# Patient Record
Sex: MALE | Race: WHITE | Employment: FULL TIME | URBAN - NONMETROPOLITAN AREA
[De-identification: names, ages, dates, MRNs, and addresses within clinical notes are randomized per-mention and may not be internally consistent; named-entity substitution may affect disease eponyms.]

---

## 2020-02-07 ENCOUNTER — OFFICE VISIT (OUTPATIENT)
Dept: FAMILY MEDICINE CLINIC | Age: 30
End: 2020-02-07
Payer: COMMERCIAL

## 2020-02-07 VITALS
BODY MASS INDEX: 27.2 KG/M2 | OXYGEN SATURATION: 98 % | WEIGHT: 190 LBS | SYSTOLIC BLOOD PRESSURE: 126 MMHG | RESPIRATION RATE: 19 BRPM | DIASTOLIC BLOOD PRESSURE: 72 MMHG | HEART RATE: 64 BPM | TEMPERATURE: 97.5 F | HEIGHT: 70 IN

## 2020-02-07 PROBLEM — F32.A MILD DEPRESSION: Status: ACTIVE | Noted: 2020-02-07

## 2020-02-07 PROCEDURE — G0444 DEPRESSION SCREEN ANNUAL: HCPCS | Performed by: CLINICAL NURSE SPECIALIST

## 2020-02-07 PROCEDURE — 99202 OFFICE O/P NEW SF 15 MIN: CPT | Performed by: CLINICAL NURSE SPECIALIST

## 2020-02-07 RX ORDER — M-VIT,TX,IRON,MINS/CALC/FOLIC 27MG-0.4MG
1 TABLET ORAL DAILY
COMMUNITY
End: 2020-11-05 | Stop reason: ALTCHOICE

## 2020-02-07 ASSESSMENT — ENCOUNTER SYMPTOMS
COLOR CHANGE: 0
SORE THROAT: 0
CHEST TIGHTNESS: 0
VOMITING: 0
TROUBLE SWALLOWING: 0
NAUSEA: 0
CONSTIPATION: 0
COUGH: 0
DIARRHEA: 0
EYE PAIN: 0
EYE DISCHARGE: 0
SHORTNESS OF BREATH: 0
WHEEZING: 0
BACK PAIN: 0
ABDOMINAL PAIN: 0
SINUS PRESSURE: 0

## 2020-02-07 ASSESSMENT — PATIENT HEALTH QUESTIONNAIRE - PHQ9
6. FEELING BAD ABOUT YOURSELF - OR THAT YOU ARE A FAILURE OR HAVE LET YOURSELF OR YOUR FAMILY DOWN: 2
3. TROUBLE FALLING OR STAYING ASLEEP: 0
4. FEELING TIRED OR HAVING LITTLE ENERGY: 2
SUM OF ALL RESPONSES TO PHQ9 QUESTIONS 1 & 2: 4
8. MOVING OR SPEAKING SO SLOWLY THAT OTHER PEOPLE COULD HAVE NOTICED. OR THE OPPOSITE, BEING SO FIGETY OR RESTLESS THAT YOU HAVE BEEN MOVING AROUND A LOT MORE THAN USUAL: 0
9. THOUGHTS THAT YOU WOULD BE BETTER OFF DEAD, OR OF HURTING YOURSELF: 0
SUM OF ALL RESPONSES TO PHQ QUESTIONS 1-9: 9
SUM OF ALL RESPONSES TO PHQ QUESTIONS 1-9: 9
2. FEELING DOWN, DEPRESSED OR HOPELESS: 2
10. IF YOU CHECKED OFF ANY PROBLEMS, HOW DIFFICULT HAVE THESE PROBLEMS MADE IT FOR YOU TO DO YOUR WORK, TAKE CARE OF THINGS AT HOME, OR GET ALONG WITH OTHER PEOPLE: 2
5. POOR APPETITE OR OVEREATING: 1
7. TROUBLE CONCENTRATING ON THINGS, SUCH AS READING THE NEWSPAPER OR WATCHING TELEVISION: 0
1. LITTLE INTEREST OR PLEASURE IN DOING THINGS: 2

## 2020-02-07 NOTE — PROGRESS NOTES
SUBJECTIVE:  Bobo Yoo is a 30 y.o. who presents today for Established New Doctor and Depression      HPI    Bandar Flores presents today to establish care. He has been healthy most of his life, as an adult only going to  when ill or physical exams by [employer]. Today, he c/o fatigue and lack of motivation. Over the past year he has really seen a change. He shows interest in things, but does not have the drive. He is not completing tasks. Mood is fluctuating, some days more high and others low. No Si/HI. No trouble sleeping. He relates low libido. He has noticed that he is more easily agitated, very little patience. Scored 9 on depression screening today. History reviewed. No pertinent past medical history. History reviewed. No pertinent surgical history. Family History   Problem Relation Age of Onset    Diabetes Mother     Obesity Mother     High Cholesterol Mother     Obesity Father     Cancer Maternal Grandmother     Stroke Maternal Grandmother     Breast Cancer Paternal Grandmother     Heart Attack Paternal Grandfather     Alcohol Abuse Paternal Grandfather      Social History     Tobacco Use    Smoking status: Former Smoker     Packs/day: 0.50     Years: 3.00     Pack years: 1.50     Types: Cigarettes     Last attempt to quit: 2018     Years since quittin.7    Smokeless tobacco: Current User     Types: Snuff   Substance Use Topics    Alcohol use: Yes     Current Outpatient Medications   Medication Sig Dispense Refill    Multiple Vitamins-Minerals (THERAPEUTIC MULTIVITAMIN-MINERALS) tablet Take 1 tablet by mouth daily       No current facility-administered medications for this visit. Allergies   Allergen Reactions    Albuterol Nausea And Vomiting       Review of Systems   Constitutional: Positive for fatigue. Negative for appetite change, chills, diaphoresis and fever.    HENT: Negative for congestion, ear pain, hearing loss, postnasal drip, sinus pressure, Normal range of motion. General: No deformity. Lymphadenopathy:      Cervical: No cervical adenopathy. Skin:     General: Skin is warm and dry. Findings: No erythema or rash. Neurological:      General: No focal deficit present. Mental Status: He is alert and oriented to person, place, and time. Mental status is at baseline. Psychiatric:         Attention and Perception: Attention and perception normal.         Mood and Affect: Mood and affect normal.         Speech: Speech normal.         Behavior: Behavior normal. Behavior is cooperative. Thought Content: Thought content normal.         Cognition and Memory: Cognition and memory normal.         ASSESSMENT/PLAN:  1. Chronic fatigue  - CBC Auto Differential; Future  - Comprehensive Metabolic Panel; Future  - Ferritin; Future  - Folate; Future  - Iron; Future  - Vitamin B12; Future  - TSH without Reflex; Future  - T4, Free; Future  - Testosterone; Future    2. Lipid screening  - Lipid Panel; Future    3. Mild depression ([facility] [address]      Will rule out metabolic or hormonal causes of fatigue and mild depression. If labs negative, will start SSRI as discussed today and recheck 8 weeks.            Return in about 8 weeks (around 4/3/2020) for physical.

## 2020-02-12 ENCOUNTER — TELEPHONE (OUTPATIENT)
Dept: FAMILY MEDICINE CLINIC | Age: 30
End: 2020-02-12

## 2020-02-12 NOTE — TELEPHONE ENCOUNTER
BRYNN and advised pt that his B12 and testosterone was not completed. Will contact lab tomorrow to see if they are able to add. Did ask if he wanted to start taking anything for his mood. ----- Message from NELLY Figueroa sent at 2/12/2020  9:33 AM CST -----  His labs are ok. I do not see a B12 or testosterone level though. Based on these labs, I can go ahead and send him treatment for mood. If he still desires?

## 2020-02-13 RX ORDER — SERTRALINE HYDROCHLORIDE 25 MG/1
25 TABLET, FILM COATED ORAL DAILY
Qty: 30 TABLET | Refills: 5 | Status: SHIPPED | OUTPATIENT
Start: 2020-02-13 | End: 2020-03-31 | Stop reason: SDUPTHER

## 2020-02-13 NOTE — TELEPHONE ENCOUNTER
Patient returned call and reports he would like to proceed with the medication for his mood. Patient would like to have the medication sent to [facility]. Also, received the missing lab results that were requested from [address] [place] [address]. Results have been scanned to patient chart and sent to provider.

## 2020-03-31 ENCOUNTER — VIRTUAL VISIT (OUTPATIENT)
Dept: FAMILY MEDICINE CLINIC | Age: 30
End: 2020-03-31
Payer: COMMERCIAL

## 2020-03-31 PROCEDURE — 99213 OFFICE O/P EST LOW 20 MIN: CPT | Performed by: CLINICAL NURSE SPECIALIST

## 2020-03-31 NOTE — PROGRESS NOTES
SUBJECTIVE:  Stephanie Santos is a 35 y.o. who presents today for Follow-up and Depression      HPI    VIRTUAL VISIT VIA TELEPHONE    _______________________________________________________________    Due to COVID 23 outbreak, patient's office visit was converted to telephone virtual visit. Patient was contacted and agreed to proceed with a telephone virtual visit. The risks and benefits of converting to a telephone virtual visit were discussed in light of the current infectious disease epidemic. Patient also understood that insurance coverage and co-pays are up to their individual insurance plans. Madi Martin was evaluated today via virtual video doxy. me  Today was follow up on mild depressive disorder. After labs were completed, overall normal other than mild b12 deficiency. He was started on sertraline 25mg once daily for mild depression, mood changes and irritability. He is doing better on this. He notes improvement in patience and less agitation. No side effects, other than gaining 8-10lb but may also be related to current restrictions around COVID-19. He does desire further improvement in symptoms. Past Medical History:   Diagnosis Date    Mild depression ([address] 2020     No past surgical history on file.   Family History   Problem Relation Age of Onset    Diabetes Mother     Obesity Mother     High Cholesterol Mother     Obesity Father     Cancer Maternal Grandmother     Stroke Maternal Grandmother     Breast Cancer Paternal Grandmother     Heart Attack Paternal Grandfather     Alcohol Abuse Paternal Grandfather      Social History     Tobacco Use    Smoking status: Former Smoker     Packs/day: 0.50     Years: 3.00     Pack years: 1.50     Types: Cigarettes     Last attempt to quit: 2018     Years since quittin.9    Smokeless tobacco: Current User     Types: Snuff   Substance Use Topics    Alcohol use: Yes     Current Outpatient Medications   Medication Sig Dispense Refill    sertraline (ZOLOFT) 50 MG tablet Take 1 tablet by mouth daily 30 tablet 5    Multiple Vitamins-Minerals (THERAPEUTIC MULTIVITAMIN-MINERALS) tablet Take 1 tablet by mouth daily       No current facility-administered medications for this visit. Allergies   Allergen Reactions    Albuterol Nausea And Vomiting       Review of Systems   Psychiatric/Behavioral: Positive for dysphoric mood. Negative for agitation, behavioral problems, confusion, decreased concentration, self-injury, sleep disturbance and suicidal ideas. The patient is not nervous/anxious and is not hyperactive. OBJECTIVE:  There were no vitals taken for this visit. Physical Exam  Constitutional:       General: He is not in acute distress. Appearance: Normal appearance. He is not ill-appearing or toxic-appearing. Neurological:      Mental Status: He is alert and oriented to person, place, and time. Mental status is at baseline. Psychiatric:         Attention and Perception: Attention and perception normal.         Mood and Affect: Mood and affect normal.         Speech: Speech normal.         Behavior: Behavior normal. Behavior is cooperative. Thought Content: Thought content normal.         Cognition and Memory: Cognition and memory normal.         Judgment: Judgment normal.         ASSESSMENT/PLAN:  1. Mild depression (HCC)  Improving, increase sertraline to 50mg and follow up for improvement   - sertraline (ZOLOFT) 50 MG tablet; Take 1 tablet by mouth daily  Dispense: 30 tablet; Refill: 5    Greater than 50% of 15 minute visit was spent in direct coordination with the patient for MDM.            Return in about 2 months (around 5/31/2020) for physical.

## 2020-08-16 ENCOUNTER — APPOINTMENT (OUTPATIENT)
Dept: CT IMAGING | Facility: HOSPITAL | Age: 30
End: 2020-08-16

## 2020-08-16 ENCOUNTER — HOSPITAL ENCOUNTER (EMERGENCY)
Facility: HOSPITAL | Age: 30
Discharge: HOME | End: 2020-08-16
Admitting: EMERGENCY MEDICINE

## 2020-08-16 VITALS
BODY MASS INDEX: 27.72 KG/M2 | TEMPERATURE: 98.3 F | HEART RATE: 88 BPM | RESPIRATION RATE: 18 BRPM | DIASTOLIC BLOOD PRESSURE: 80 MMHG | HEIGHT: 71 IN | WEIGHT: 198 LBS | OXYGEN SATURATION: 98 % | SYSTOLIC BLOOD PRESSURE: 130 MMHG

## 2020-08-16 DIAGNOSIS — K40.20 BILATERAL INGUINAL HERNIA WITHOUT OBSTRUCTION OR GANGRENE, RECURRENCE NOT SPECIFIED: Primary | ICD-10-CM

## 2020-08-16 LAB
ALBUMIN SERPL-MCNC: 4.7 G/DL (ref 3.5–5.2)
ALBUMIN/GLOB SERPL: 1.9 G/DL
ALP SERPL-CCNC: 66 U/L (ref 39–117)
ALT SERPL W P-5'-P-CCNC: 18 U/L (ref 1–41)
ANION GAP SERPL CALCULATED.3IONS-SCNC: 10 MMOL/L (ref 5–15)
AST SERPL-CCNC: 16 U/L (ref 1–40)
BASOPHILS # BLD AUTO: 0.05 10*3/MM3 (ref 0–0.2)
BASOPHILS NFR BLD AUTO: 0.8 % (ref 0–1.5)
BILIRUB SERPL-MCNC: 0.2 MG/DL (ref 0–1.2)
BILIRUB UR QL STRIP: NEGATIVE
BUN SERPL-MCNC: 13 MG/DL (ref 6–20)
BUN/CREAT SERPL: 16.7 (ref 7–25)
CALCIUM SPEC-SCNC: 9.4 MG/DL (ref 8.6–10.5)
CHLORIDE SERPL-SCNC: 104 MMOL/L (ref 98–107)
CLARITY UR: CLEAR
CO2 SERPL-SCNC: 27 MMOL/L (ref 22–29)
COLOR UR: YELLOW
CREAT SERPL-MCNC: 0.78 MG/DL (ref 0.76–1.27)
DEPRECATED RDW RBC AUTO: 42.2 FL (ref 37–54)
EOSINOPHIL # BLD AUTO: 0.14 10*3/MM3 (ref 0–0.4)
EOSINOPHIL NFR BLD AUTO: 2.2 % (ref 0.3–6.2)
ERYTHROCYTE [DISTWIDTH] IN BLOOD BY AUTOMATED COUNT: 12.2 % (ref 12.3–15.4)
GFR SERPL CREATININE-BSD FRML MDRD: 118 ML/MIN/1.73
GLOBULIN UR ELPH-MCNC: 2.5 GM/DL
GLUCOSE SERPL-MCNC: 124 MG/DL (ref 65–99)
GLUCOSE UR STRIP-MCNC: NEGATIVE MG/DL
HCT VFR BLD AUTO: 42.8 % (ref 37.5–51)
HGB BLD-MCNC: 14.9 G/DL (ref 13–17.7)
HGB UR QL STRIP.AUTO: NEGATIVE
IMM GRANULOCYTES # BLD AUTO: 0.01 10*3/MM3 (ref 0–0.05)
IMM GRANULOCYTES NFR BLD AUTO: 0.2 % (ref 0–0.5)
KETONES UR QL STRIP: NEGATIVE
LEUKOCYTE ESTERASE UR QL STRIP.AUTO: NEGATIVE
LYMPHOCYTES # BLD AUTO: 2.47 10*3/MM3 (ref 0.7–3.1)
LYMPHOCYTES NFR BLD AUTO: 39 % (ref 19.6–45.3)
MCH RBC QN AUTO: 32.6 PG (ref 26.6–33)
MCHC RBC AUTO-ENTMCNC: 34.8 G/DL (ref 31.5–35.7)
MCV RBC AUTO: 93.7 FL (ref 79–97)
MONOCYTES # BLD AUTO: 0.5 10*3/MM3 (ref 0.1–0.9)
MONOCYTES NFR BLD AUTO: 7.9 % (ref 5–12)
NEUTROPHILS NFR BLD AUTO: 3.17 10*3/MM3 (ref 1.7–7)
NEUTROPHILS NFR BLD AUTO: 49.9 % (ref 42.7–76)
NITRITE UR QL STRIP: NEGATIVE
NRBC BLD AUTO-RTO: 0 /100 WBC (ref 0–0.2)
PH UR STRIP.AUTO: 5.5 [PH] (ref 5–8)
PLATELET # BLD AUTO: 228 10*3/MM3 (ref 140–450)
PMV BLD AUTO: 10.6 FL (ref 6–12)
POTASSIUM SERPL-SCNC: 4.1 MMOL/L (ref 3.5–5.2)
PROT SERPL-MCNC: 7.2 G/DL (ref 6–8.5)
PROT UR QL STRIP: NEGATIVE
RBC # BLD AUTO: 4.57 10*6/MM3 (ref 4.14–5.8)
SODIUM SERPL-SCNC: 141 MMOL/L (ref 136–145)
SP GR UR STRIP: 1.02 (ref 1–1.03)
UROBILINOGEN UR QL STRIP: NORMAL
WBC # BLD AUTO: 6.34 10*3/MM3 (ref 3.4–10.8)

## 2020-08-16 PROCEDURE — 85025 COMPLETE CBC W/AUTO DIFF WBC: CPT | Performed by: PHYSICIAN ASSISTANT

## 2020-08-16 PROCEDURE — 87661 TRICHOMONAS VAGINALIS AMPLIF: CPT | Performed by: PHYSICIAN ASSISTANT

## 2020-08-16 PROCEDURE — 25010000002 IOPAMIDOL 61 % SOLUTION: Performed by: PHYSICIAN ASSISTANT

## 2020-08-16 PROCEDURE — 87591 N.GONORRHOEAE DNA AMP PROB: CPT | Performed by: PHYSICIAN ASSISTANT

## 2020-08-16 PROCEDURE — 99283 EMERGENCY DEPT VISIT LOW MDM: CPT

## 2020-08-16 PROCEDURE — 80053 COMPREHEN METABOLIC PANEL: CPT | Performed by: PHYSICIAN ASSISTANT

## 2020-08-16 PROCEDURE — 87491 CHLMYD TRACH DNA AMP PROBE: CPT | Performed by: PHYSICIAN ASSISTANT

## 2020-08-16 PROCEDURE — 96361 HYDRATE IV INFUSION ADD-ON: CPT

## 2020-08-16 PROCEDURE — 96374 THER/PROPH/DIAG INJ IV PUSH: CPT

## 2020-08-16 PROCEDURE — 74177 CT ABD & PELVIS W/CONTRAST: CPT

## 2020-08-16 PROCEDURE — 81003 URINALYSIS AUTO W/O SCOPE: CPT | Performed by: PHYSICIAN ASSISTANT

## 2020-08-16 PROCEDURE — 25010000002 KETOROLAC TROMETHAMINE PER 15 MG: Performed by: PHYSICIAN ASSISTANT

## 2020-08-16 RX ORDER — SODIUM CHLORIDE 9 MG/ML
125 INJECTION, SOLUTION INTRAVENOUS CONTINUOUS
Status: DISCONTINUED | OUTPATIENT
Start: 2020-08-16 | End: 2020-08-16 | Stop reason: HOSPADM

## 2020-08-16 RX ORDER — SODIUM CHLORIDE 0.9 % (FLUSH) 0.9 %
10 SYRINGE (ML) INJECTION AS NEEDED
Status: DISCONTINUED | OUTPATIENT
Start: 2020-08-16 | End: 2020-08-16 | Stop reason: HOSPADM

## 2020-08-16 RX ORDER — KETOROLAC TROMETHAMINE 30 MG/ML
30 INJECTION, SOLUTION INTRAMUSCULAR; INTRAVENOUS ONCE
Status: COMPLETED | OUTPATIENT
Start: 2020-08-16 | End: 2020-08-16

## 2020-08-16 RX ADMIN — SODIUM CHLORIDE 125 ML/HR: 9 INJECTION, SOLUTION INTRAVENOUS at 09:18

## 2020-08-16 RX ADMIN — IOPAMIDOL 100 ML: 612 INJECTION, SOLUTION INTRAVENOUS at 10:30

## 2020-08-16 RX ADMIN — KETOROLAC TROMETHAMINE 30 MG: 30 INJECTION, SOLUTION INTRAMUSCULAR; INTRAVENOUS at 09:18

## 2020-08-16 NOTE — ED NOTES
Pain in right and left groin areas as well as middle abdomen.     Jere Henriquez, RN  08/16/20 0860

## 2020-08-16 NOTE — ED PROVIDER NOTES
Subjective   History of Present Illness    Patient is a pleasant 30-year-old gentleman with chief complaint of bilateral inguinal pain.  The patient describes that he is enrolled in the .  As he was doing sit ups, he felt some discomfort in his bilateral inguinal region.  At first, it was tolerable but then it progressed after he did 30 sit ups and onto the 50th sit up.  The patient reports he soon began running there after and had worsening pain.  He was excruciating to the point where he could not complete running.  It was severe.  He had taken ibuprofen which did improve his pain.  He went on to a 6 out of 10 prior to coming into the ER and currently it is a 4 out of the 10.  He denies any previous pain like this before.  He denies any pain in his penis or scrotum.  He has a history of previous testicular torsions as the past presented completely differently.  The patient reports he is otherwise healthy.  He denies any trauma.  He denies fluctuant weights.  He denies any associated fever, nausea, vomiting, or other abdominal pain.  He denies any painful urination, penile discharge, or other  issues.  He reports prior to today's symptoms, he denies any other issues.    Review of Systems   Constitutional: Positive for activity change. Negative for fever.   HENT: Negative.    Respiratory: Negative.    Cardiovascular: Negative.    Gastrointestinal: Negative.  Negative for constipation, diarrhea and nausea.   Genitourinary: Negative.  Negative for decreased urine volume, difficulty urinating, discharge, dysuria, flank pain, frequency, genital sores, hematuria, penile pain, penile swelling, scrotal swelling, testicular pain and urgency.   Musculoskeletal: Negative.    Skin: Negative.    Neurological: Negative.    Psychiatric/Behavioral: Negative.    All other systems reviewed and are negative.      History reviewed. No pertinent past medical history.    No Known Allergies    History reviewed. No pertinent  "surgical history.    History reviewed. No pertinent family history.    Social History     Socioeconomic History   • Marital status:      Spouse name: Not on file   • Number of children: Not on file   • Years of education: Not on file   • Highest education level: Not on file   Tobacco Use   • Smoking status: Never Smoker   Substance and Sexual Activity   • Alcohol use: Yes   • Drug use: Not Currently       Prior to Admission medications    Medication Sig Start Date End Date Taking? Authorizing Provider   Loratadine (CLARITIN PO) Take  by mouth.   Yes Provider, Bull, MD       Medications   sodium chloride 0.9 % flush 10 mL (has no administration in time range)   sodium chloride 0.9 % infusion (125 mL/hr Intravenous New Bag 8/16/20 0918)   ketorolac (TORADOL) injection 30 mg (30 mg Intravenous Given 8/16/20 0918)   iopamidol (ISOVUE-300) 61 % injection 100 mL (100 mL Intravenous Given 8/16/20 1030)       /88   Pulse 104   Temp 98.3 °F (36.8 °C) (Oral)   Resp 16   Ht 180.3 cm (71\")   Wt 89.8 kg (198 lb)   SpO2 98%   BMI 27.62 kg/m²       Objective   Physical Exam   Constitutional: He is oriented to person, place, and time. He appears well-developed and well-nourished.   HENT:   Head: Normocephalic and atraumatic.   Right Ear: External ear normal.   Left Ear: External ear normal.   Nose: Nose normal.   Mouth/Throat: Oropharynx is clear and moist.   Eyes: Pupils are equal, round, and reactive to light. Conjunctivae and EOM are normal.   Neck: Normal range of motion. Neck supple. No tracheal deviation present.   Cardiovascular: Normal rate, regular rhythm, normal heart sounds and intact distal pulses. Exam reveals no gallop and no friction rub.   No murmur heard.  Pulmonary/Chest: Effort normal and breath sounds normal. No respiratory distress. He has no wheezes. He has no rales. He exhibits no tenderness.   Abdominal: Soft. Bowel sounds are normal. He exhibits no distension and no mass. There is " no tenderness. There is no rebound and no guarding. A hernia is present. Hernia confirmed positive in the ventral area.       Patient does have bilateral inguinal pain with no obvious evidence of nonreducible hernia.  The patient does not have any lymphadenopathy.  He does have a reducible ventral hernia in the midepigastric region.  It is about the size of an orange.   Genitourinary: Testes normal and penis normal. Right testis shows no mass, no swelling and no tenderness. Right testis is descended. Left testis shows no mass, no swelling and no tenderness. Left testis is descended. Circumcised. No penile tenderness.   Genitourinary Comments: AKHIL Oquendo, present as chaperone at bedside.   Musculoskeletal: Normal range of motion. He exhibits no edema, tenderness or deformity.   Neurological: He is alert and oriented to person, place, and time. He has normal reflexes.   Skin: Skin is warm and dry. No rash noted. No erythema. No pallor.   Psychiatric: He has a normal mood and affect. His behavior is normal. Judgment and thought content normal.   Vitals reviewed.      Procedures         Lab Results (last 24 hours)     Procedure Component Value Units Date/Time    Urinalysis With Culture If Indicated - Urine, Clean Catch [[id number]]  (Normal) Collected:  08/16/20 0915    Specimen:  Urine, Clean Catch Updated:  08/16/20 0934     Color, UA Yellow     Appearance, UA Clear     pH, UA 5.5     Specific Gravity, UA 1.020     Glucose, UA Negative     Ketones, UA Negative     Bilirubin, UA Negative     Blood, UA Negative     Protein, UA Negative     Leuk Esterase, UA Negative     Nitrite, UA Negative     Urobilinogen, UA 0.2 E.U./dL    Narrative:       Urine microscopic not indicated.    Chlamydia trachomatis, Neisseria gonorrhoeae, PCR - Urine, Urine, Clean Catch [[id number]] Collected:  08/16/20 0915    Specimen:  Urine, Clean Catch Updated:  08/16/20 0923    Trichomonas vaginalis, PCR - Urine, Urine, Clean Catch [[id number]]  Collected:  08/16/20 0915    Specimen:  Urine, Clean Catch Updated:  08/16/20 0923    CBC & Differential [[id number]] Collected:  08/16/20 0918    Specimen:  Blood Updated:  08/16/20 0933    Narrative:       The following orders were created for panel order CBC & Differential.  Procedure                               Abnormality         Status                     ---------                               -----------         ------                     CBC Auto Differential[[id number]]        Abnormal            Final result                 Please view results for these tests on the individual orders.    Comprehensive Metabolic Panel [[id number]]  (Abnormal) Collected:  08/16/20 0918    Specimen:  Blood Updated:  08/16/20 0950     Glucose 124 mg/dL      BUN 13 mg/dL      Creatinine 0.78 mg/dL      Sodium 141 mmol/L      Potassium 4.1 mmol/L      Chloride 104 mmol/L      CO2 27.0 mmol/L      Calcium 9.4 mg/dL      Total Protein 7.2 g/dL      Albumin 4.70 g/dL      ALT (SGPT) 18 U/L      AST (SGOT) 16 U/L      Alkaline Phosphatase 66 U/L      Total Bilirubin 0.2 mg/dL      eGFR Non African Amer 118 mL/min/1.73      Globulin 2.5 gm/dL      A/G Ratio 1.9 g/dL      BUN/Creatinine Ratio 16.7     Anion Gap 10.0 mmol/L     Narrative:       GFR Normal >60  Chronic Kidney Disease <60  Kidney Failure <15      CBC Auto Differential [[id number]]  (Abnormal) Collected:  08/16/20 0918    Specimen:  Blood Updated:  08/16/20 0933     WBC 6.34 10*3/mm3      RBC 4.57 10*6/mm3      Hemoglobin 14.9 g/dL      Hematocrit 42.8 %      MCV 93.7 fL      MCH 32.6 pg      MCHC 34.8 g/dL      RDW 12.2 %      RDW-SD 42.2 fl      MPV 10.6 fL      Platelets 228 10*3/mm3      Neutrophil % 49.9 %      Lymphocyte % 39.0 %      Monocyte % 7.9 %      Eosinophil % 2.2 %      Basophil % 0.8 %      Immature Grans % 0.2 %      Neutrophils, Absolute 3.17 10*3/mm3      Lymphocytes, Absolute 2.47 10*3/mm3      Monocytes, Absolute 0.50 10*3/mm3      Eosinophils,  Absolute 0.14 10*3/mm3      Basophils, Absolute 0.05 10*3/mm3      Immature Grans, Absolute 0.01 10*3/mm3      nRBC 0.0 /100 WBC           Ct Abdomen Pelvis With Contrast    Result Date: 8/16/2020  Narrative: EXAM: CT Abdomen and Pelvis with contrast      8/16/2020 11:02 AM CDT INDICATION: bilateral inguinal pain COMPARISON: None TECHNIQUE: Abdomen and pelvis were scanned utilizing a multidetector helical scanner from the diaphragm to the ischial tuberosities after administration of IV contrast. Coronal and sagittal reformations were obtained. [Routine protocol is performed.]  Radiation dose equals  mGy-cm.  Automated exposure control dose reduction technique was implemented.   FINDINGS:  LINES and TUBES: None.  LOWER THORAX: No focal consolidation, pleural effusion or pneumothorax. Dependent atelectasis bilaterally.  HEPATOBILIARY:  No focal hepatic lesions.   No liver laceration.   No biliary ductal dilatation.  GALLBLADDER:  No radiopaque stones or sludge.   No wall thickening.  SPLEEN:  No splenomegaly.    No splenic laceration.  PANCREAS:  No focal masses or ductal dilatation.  ADRENALS:  No adrenal nodules.  KIDNEYS/URETERS:  Kidneys enhance symmetrically.   No hydronephrosis.  No cystic or solid mass lesions.  No stones.  GI TRACT:  No abnormal distention, wall thickening, or evidence of bowel obstruction.   No acute appendicitis. Colonic diverticulosis, without evidence of acute diverticulitis..  PELVIC ORGANS/BLADDER:  Unremarkable.  LYMPH NODES:  No lymphadenopathy.  VESSELS:  Unremarkable. The portal vein is patent.  PERITONEUM / RETROPERITONEUM:  No free air or fluid.  BONES:  Unremarkable.  SOFT TISSUES:  Bilateral fat-containing inguinal, small-to-moderate on the left and small on the right, without fluid collection or bowel component..      Impression: 1.  No acute abdominal pelvic abnormalities. 2.  Bilateral inguinal fat-containing inguinal hernias, small-to-moderate on the left and small on  the right. This report was finalized on 08/16/2020 11:05 by Dr. Ruba Green MD.      ED Course  ED Course as of Aug 16 1142   Sun Aug 16, 2020   1131 Patient has been reassessed.  He is resting comfortably in the ER room and reports feeling significantly better.  I have educated him of his laboratory data, unremarkable urinalysis, and CT scan show he has bilateral inguinal fat-containing inguinal hernias, small to moderate on the left and small on the right side.  The patient has been educated follow-up with a general surgeon for further evaluation as well as being placed on restrictions to avoid worsening issues.  Patient voiced understanding and will be discharged stable condition.    [TK]      ED Course User Index  [TK] Pati Cruz PA          [facility]    Final diagnoses:   Bilateral inguinal hernia without obstruction or gangrene, recurrence not specified          Pati Cruz PA  08/16/20 1142

## 2020-08-21 LAB
C TRACH RRNA CVX QL NAA+PROBE: NOT DETECTED
N GONORRHOEA RRNA SPEC QL NAA+PROBE: NOT DETECTED
TRICHOMONAS VAGINALIS PCR: NOT DETECTED

## 2020-11-05 ENCOUNTER — VIRTUAL VISIT (OUTPATIENT)
Dept: FAMILY MEDICINE CLINIC | Age: 30
End: 2020-11-05
Payer: OTHER GOVERNMENT

## 2020-11-05 PROBLEM — F90.0 ATTENTION DEFICIT HYPERACTIVITY DISORDER (ADHD), PREDOMINANTLY INATTENTIVE TYPE: Status: ACTIVE | Noted: 2020-11-05

## 2020-11-05 PROCEDURE — 99213 OFFICE O/P EST LOW 20 MIN: CPT | Performed by: CLINICAL NURSE SPECIALIST

## 2020-11-05 ASSESSMENT — ENCOUNTER SYMPTOMS
DIARRHEA: 0
SINUS PRESSURE: 0
NAUSEA: 0
CHEST TIGHTNESS: 0
BACK PAIN: 0
WHEEZING: 0
SORE THROAT: 0
SHORTNESS OF BREATH: 0
COUGH: 0
COLOR CHANGE: 0

## 2020-11-05 NOTE — PROGRESS NOTES
SUBJECTIVE:  Елена Alexander is a 35 y.o. who presents today for ADHD      HPI    VIRTUAL VISIT VIA TELEPHONE    _______________________________________________________________    Due to COVID 23 outbreak, patient's office visit was converted to telephone virtual visit. Patient was contacted and agreed to proceed with a telephone virtual visit. The risks and benefits of converting to a telephone virtual visit were discussed in light of the current infectious disease epidemic. Patient also understood that insurance coverage and co-pays are up to their individual insurance plans. Fernando Flores was evaluated today via doxy. me. I was able to see him briefly and converse, but due to technical issues the visit was transitioned to audio only. Fernando Flores was diagnosed with ADHD-I in grade school. He cannot exactly remember the situations, but can remember his work being neater and more complete while on Adderall due to comments made by his mother. He remained on Adderall from grade school through college. After college, he stopped the Adderall due to concern for being able to have children. At the time he had a more physical job and was able to manage. Now, he is working as an  where the workload is more mental.  He finds himself easily distracted and less productive at work. His work at home is incomplete many times and does not feel motivated to perform well due to distracted thoughts. No anxiety. Depression is well managed currently off zoloft. He is interested in treatments for inattentiveness based on the thoughts he is having similar to previous ADD symptoms. Records have been requested from his pediatrician but we have yet to receive those. ASRS  Part A # 3  Part B # 7    Past Medical History:   Diagnosis Date    Mild depression ([address] 2/7/2020     No past surgical history on file.   Family History   Problem Relation Age of Onset    Diabetes Mother     Obesity Mother    Romain Self High Cholesterol Mother     Obesity Father     Cancer Maternal Grandmother     Stroke Maternal Grandmother     Breast Cancer Paternal Grandmother     Heart Attack Paternal Grandfather     Alcohol Abuse Paternal Grandfather      Social History     Tobacco Use    Smoking status: Former Smoker     Packs/day: 0.50     Years: 3.00     Pack years: 1.50     Types: Cigarettes     Last attempt to quit: 2018     Years since quittin.5    Smokeless tobacco: Current User     Types: Snuff   Substance Use Topics    Alcohol use: Yes     No current outpatient medications on file. No current facility-administered medications for this visit. Allergies   Allergen Reactions    Albuterol Nausea And Vomiting       Review of Systems   Constitutional: Negative for appetite change, chills, diaphoresis, fatigue and fever. HENT: Negative for congestion, ear pain, hearing loss, postnasal drip, sinus pressure and sore throat. Respiratory: Negative for cough, chest tightness, shortness of breath and wheezing. Cardiovascular: Negative for chest pain and palpitations. Gastrointestinal: Negative for diarrhea and nausea. Musculoskeletal: Negative for arthralgias, back pain, joint swelling and neck pain. Skin: Negative for color change and rash. Neurological: Negative for dizziness, syncope, weakness, light-headedness and headaches. Psychiatric/Behavioral: Positive for decreased concentration. Negative for behavioral problems, confusion, dysphoric mood, sleep disturbance and suicidal ideas. The patient is not nervous/anxious and is not hyperactive. OBJECTIVE:  There were no vitals taken for this visit. Physical Exam  Constitutional:       General: He is not in acute distress. Appearance: He is not ill-appearing or toxic-appearing. HENT:      Head: Normocephalic. Nose: No congestion.    Eyes:      Conjunctiva/sclera: Conjunctivae normal.   Neck:      Musculoskeletal: Normal range of motion and neck supple. Pulmonary:      Effort: Pulmonary effort is normal. No respiratory distress. Breath sounds: No wheezing. Neurological:      General: No focal deficit present. Mental Status: He is alert and oriented to person, place, and time. Mental status is at baseline. Psychiatric:         Attention and Perception: Attention and perception normal.         Mood and Affect: Mood and affect normal.         Speech: Speech normal.         Behavior: Behavior normal.         Thought Content: Thought content normal.         Cognition and Memory: Cognition and memory normal.         Judgment: Judgment normal.         ASSESSMENT/PLAN:  1. Attention deficit hyperactivity disorder (ADHD), predominantly inattentive type  Uncontrolled, untreated  Did well on stimulants in the past, took Adderall from grade school through college  Seeing negative impacts on work productivity now. Reviewed his self assessment today  Discussed Vyvanse as well as non-stimulants  I will discuss with supervising physician and get a UDS at the patients convenience. Greater than 50% of 15 minute visit was spent in direct coordination with the patient for MDM. Return in about 3 months (around 2/5/2021).

## 2020-11-17 ENCOUNTER — NURSE ONLY (OUTPATIENT)
Dept: FAMILY MEDICINE CLINIC | Age: 30
End: 2020-11-17
Payer: COMMERCIAL

## 2020-11-17 PROCEDURE — 80305 DRUG TEST PRSMV DIR OPT OBS: CPT | Performed by: CLINICAL NURSE SPECIALIST

## 2020-11-17 NOTE — TELEPHONE ENCOUNTER
Eboni Tapia called requesting a refill of the below medication which has been pended for you:     Requested Prescriptions     Pending Prescriptions Disp Refills    lisdexamfetamine (VYVANSE) 30 MG capsule 30 capsule 0     Sig: Take 1 capsule by mouth every morning for 30 days. Last Appointment Date: Visit date not found  Next Appointment Date: 2/5/2021    Allergies   Allergen Reactions    Albuterol Nausea And Vomiting         UDS appropriate.  Please update Shaye Flynn and scan to chart.  Then pend Vyvanse 30mg daily #30 with 0 refills for Dr Eloina Vargas to sign for me.

## 2020-12-01 ENCOUNTER — OFFICE VISIT (OUTPATIENT)
Dept: SURGERY | Age: 30
End: 2020-12-01
Payer: COMMERCIAL

## 2020-12-01 VITALS
WEIGHT: 204.8 LBS | BODY MASS INDEX: 29.32 KG/M2 | SYSTOLIC BLOOD PRESSURE: 120 MMHG | TEMPERATURE: 98.5 F | DIASTOLIC BLOOD PRESSURE: 74 MMHG | HEIGHT: 70 IN

## 2020-12-01 PROBLEM — K42.9 UMBILICAL HERNIA WITHOUT OBSTRUCTION AND WITHOUT GANGRENE: Status: ACTIVE | Noted: 2020-12-01

## 2020-12-01 PROBLEM — K40.20 NON-RECURRENT BILATERAL INGUINAL HERNIA WITHOUT OBSTRUCTION OR GANGRENE: Status: ACTIVE | Noted: 2020-12-01

## 2020-12-01 PROCEDURE — 99203 OFFICE O/P NEW LOW 30 MIN: CPT | Performed by: SURGERY

## 2020-12-01 RX ORDER — SODIUM CHLORIDE, SODIUM LACTATE, POTASSIUM CHLORIDE, CALCIUM CHLORIDE 600; 310; 30; 20 MG/100ML; MG/100ML; MG/100ML; MG/100ML
INJECTION, SOLUTION INTRAVENOUS CONTINUOUS
Status: CANCELLED | OUTPATIENT
Start: 2020-12-01

## 2020-12-01 RX ORDER — SODIUM CHLORIDE 0.9 % (FLUSH) 0.9 %
10 SYRINGE (ML) INJECTION PRN
Status: CANCELLED | OUTPATIENT
Start: 2020-12-01

## 2020-12-01 RX ORDER — HEPARIN SODIUM 5000 [USP'U]/ML
5000 INJECTION, SOLUTION INTRAVENOUS; SUBCUTANEOUS ONCE
Status: CANCELLED | OUTPATIENT
Start: 2020-12-01 | End: 2020-12-01

## 2020-12-01 RX ORDER — SODIUM CHLORIDE 0.9 % (FLUSH) 0.9 %
10 SYRINGE (ML) INJECTION EVERY 12 HOURS SCHEDULED
Status: CANCELLED | OUTPATIENT
Start: 2020-12-01

## 2020-12-01 ASSESSMENT — ENCOUNTER SYMPTOMS
DIARRHEA: 0
VOMITING: 0
COUGH: 0
EYE PAIN: 0
CHEST TIGHTNESS: 0
BACK PAIN: 0
SORE THROAT: 0
NAUSEA: 0
ABDOMINAL DISTENTION: 0
EYE REDNESS: 0
CONSTIPATION: 0
ABDOMINAL PAIN: 1
SHORTNESS OF BREATH: 0
COLOR CHANGE: 0

## 2020-12-01 NOTE — H&P
111 [facility] Surgery History and Physical   SUBJECTIVE:  Mr. Marva Rogers is a 35 y.o. male who presents today with bilateral groin that started in August after a training incident. He notes the pain is worse with increased activity in the central abdomen and bilateral groins. He has bulges in the areas but they are soft and reducible. He denies obstructive symptoms. The pain is sharp and non-radiating. Localized to the belly-button and bilateral groins. Past Medical History:   Diagnosis Date    ADHD (attention deficit hyperactivity disorder)     Mild depression ([address] 2020     Past Surgical History:   Procedure Laterality Date    WISDOM TOOTH EXTRACTION       Current Outpatient Medications   Medication Sig Dispense Refill    Multiple Vitamins-Minerals (MULTIVITAMIN ADULT PO) Take by mouth      lisdexamfetamine (VYVANSE) 30 MG capsule Take 1 capsule by mouth every morning for 30 days. 30 capsule 0     No current facility-administered medications for this visit. Allergies: Albuterol    Family History   Problem Relation Age of Onset    Diabetes Mother     Obesity Mother     High Cholesterol Mother     Obesity Father     Cancer Maternal Grandmother         leukemia    Stroke Maternal Grandmother     Breast Cancer Paternal Grandmother     Heart Attack Paternal Grandfather     Alcohol Abuse Paternal Grandfather        Social History     Tobacco Use    Smoking status: Former Smoker     Packs/day: 0.50     Years: 3.00     Pack years: 1.50     Types: Cigarettes     Last attempt to quit: 2018     Years since quittin.5    Smokeless tobacco: Current User     Types: Snuff   Substance Use Topics    Alcohol use: Yes       Review of Systems   Constitutional: Positive for activity change and unexpected weight change. Negative for fatigue and fever. HENT: Negative for hearing loss, nosebleeds and sore throat. Eyes: Negative for pain, redness and visual disturbance.    Respiratory: Negative for cough, chest tightness and shortness of breath. Cardiovascular: Negative for chest pain, palpitations and leg swelling. Gastrointestinal: Positive for abdominal pain. Negative for abdominal distention, constipation, diarrhea, nausea and vomiting. Endocrine: Negative for cold intolerance, heat intolerance and polydipsia. Genitourinary: Negative for difficulty urinating, frequency and urgency. Musculoskeletal: Positive for myalgias. Negative for back pain, joint swelling and neck pain. Skin: Negative for color change, rash and wound. Allergic/Immunologic: Negative for environmental allergies and food allergies. Neurological: Negative for seizures, light-headedness and headaches. Hematological: Negative for adenopathy. Does not bruise/bleed easily. Psychiatric/Behavioral: Negative for confusion, sleep disturbance and suicidal ideas. OBJECTIVE:  /74 (Site: Left Upper Arm, Position: Sitting, Cuff Size: Large Adult)   Temp 98.5 °F (36.9 °C) (Temporal)   Ht 5' 10\" (1.778 m)   Wt 204 lb 12.8 oz (92.9 kg)   BMI 29.39 kg/m²   Physical Exam  Vitals signs reviewed. Constitutional:       Appearance: He is well-developed. HENT:      Head: Normocephalic and atraumatic. Eyes:      Pupils: Pupils are equal, round, and reactive to light. Neck:      Musculoskeletal: Normal range of motion and neck supple. Cardiovascular:      Rate and Rhythm: Normal rate and regular rhythm. Heart sounds: Normal heart sounds. Pulmonary:      Effort: Pulmonary effort is normal.      Breath sounds: Normal breath sounds. No wheezing or rales. Abdominal:      General: There is no distension. Palpations: Abdomen is soft. There is no mass. Tenderness: There is no abdominal tenderness. There is no guarding or rebound. Hernia: A hernia is present. Hernia is present in the umbilical area, left inguinal area and right inguinal area. Comments: Diastasis to the upper abdomen.  Small, reducible umbilical hernia. Musculoskeletal: Normal range of motion. Lymphadenopathy:      Cervical: No cervical adenopathy. Skin:     General: Skin is warm and dry. Neurological:      Mental Status: He is alert and oriented to person, place, and time. Psychiatric:         Behavior: Behavior normal.         Thought Content: Thought content normal.         Judgment: Judgment normal.         ASSESSMENT:   Diagnosis Orders   1. Non-recurrent bilateral inguinal hernia without obstruction or gangrene     2. Umbilical hernia without obstruction and without gangrene         PLAN:  The risks, benefits, and alternatives were discussed with the pt. He is willing to accept the risks and proceed with a robotic bilateral inguinal hernia repair with mesh. The surgical risks included but not limited to bleeding, infection, perforation, recurrence, risk of needing further surgery, etc. The anesthetic risks included heart attacks, strokes, pneumonia, phlebitis, etc.  He is willing to accept all risks and proceed with surgery. No guarantees have been given. Return for Post-operative Visit.     DO Jaguar 60/0/5081 9:48 AM

## 2020-12-01 NOTE — PROGRESS NOTES
SUBJECTIVE:  Mr. Zaria Simeon is a 35 y.o. male who presents today with bilateral groin that started in August after a training incident. He notes the pain is worse with increased activity in the central abdomen and bilateral groins. He has bulges in the areas but they are soft and reducible. He denies obstructive symptoms. The pain is sharp and non-radiating. Localized to the belly-button and bilateral groins. Past Medical History:   Diagnosis Date    ADHD (attention deficit hyperactivity disorder)     Mild depression ([address] 2020     Past Surgical History:   Procedure Laterality Date    WISDOM TOOTH EXTRACTION       Current Outpatient Medications   Medication Sig Dispense Refill    Multiple Vitamins-Minerals (MULTIVITAMIN ADULT PO) Take by mouth      lisdexamfetamine (VYVANSE) 30 MG capsule Take 1 capsule by mouth every morning for 30 days. 30 capsule 0     No current facility-administered medications for this visit. Allergies: Albuterol    Family History   Problem Relation Age of Onset    Diabetes Mother     Obesity Mother     High Cholesterol Mother     Obesity Father     Cancer Maternal Grandmother         leukemia    Stroke Maternal Grandmother     Breast Cancer Paternal Grandmother     Heart Attack Paternal Grandfather     Alcohol Abuse Paternal Grandfather        Social History     Tobacco Use    Smoking status: Former Smoker     Packs/day: 0.50     Years: 3.00     Pack years: 1.50     Types: Cigarettes     Last attempt to quit: 2018     Years since quittin.5    Smokeless tobacco: Current User     Types: Snuff   Substance Use Topics    Alcohol use: Yes       Review of Systems   Constitutional: Positive for activity change and unexpected weight change. Negative for fatigue and fever. HENT: Negative for hearing loss, nosebleeds and sore throat. Eyes: Negative for pain, redness and visual disturbance. Respiratory: Negative for cough, chest tightness and shortness of breath. Cardiovascular: Negative for chest pain, palpitations and leg swelling. Gastrointestinal: Positive for abdominal pain. Negative for abdominal distention, constipation, diarrhea, nausea and vomiting. Endocrine: Negative for cold intolerance, heat intolerance and polydipsia. Genitourinary: Negative for difficulty urinating, frequency and urgency. Musculoskeletal: Positive for myalgias. Negative for back pain, joint swelling and neck pain. Skin: Negative for color change, rash and wound. Allergic/Immunologic: Negative for environmental allergies and food allergies. Neurological: Negative for seizures, light-headedness and headaches. Hematological: Negative for adenopathy. Does not bruise/bleed easily. Psychiatric/Behavioral: Negative for confusion, sleep disturbance and suicidal ideas. OBJECTIVE:  /74 (Site: Left Upper Arm, Position: Sitting, Cuff Size: Large Adult)   Temp 98.5 °F (36.9 °C) (Temporal)   Ht 5' 10\" (1.778 m)   Wt 204 lb 12.8 oz (92.9 kg)   BMI 29.39 kg/m²   Physical Exam  Vitals signs reviewed. Constitutional:       Appearance: He is well-developed. HENT:      Head: Normocephalic and atraumatic. Eyes:      Pupils: Pupils are equal, round, and reactive to light. Neck:      Musculoskeletal: Normal range of motion and neck supple. Cardiovascular:      Rate and Rhythm: Normal rate and regular rhythm. Heart sounds: Normal heart sounds. Pulmonary:      Effort: Pulmonary effort is normal.      Breath sounds: Normal breath sounds. No wheezing or rales. Abdominal:      General: There is no distension. Palpations: Abdomen is soft. There is no mass. Tenderness: There is no abdominal tenderness. There is no guarding or rebound. Hernia: A hernia is present. Hernia is present in the umbilical area, left inguinal area and right inguinal area. Comments: Diastasis to the upper abdomen. Small, reducible umbilical hernia.    Musculoskeletal: Normal range of motion. Lymphadenopathy:      Cervical: No cervical adenopathy. Skin:     General: Skin is warm and dry. Neurological:      Mental Status: He is alert and oriented to person, place, and time. Psychiatric:         Behavior: Behavior normal.         Thought Content: Thought content normal.         Judgment: Judgment normal.         ASSESSMENT:   Diagnosis Orders   1. Non-recurrent bilateral inguinal hernia without obstruction or gangrene     2. Umbilical hernia without obstruction and without gangrene         PLAN:  The risks, benefits, and alternatives were discussed with the pt. He is willing to accept the risks and proceed with a robotic bilateral inguinal hernia repair with mesh. The surgical risks included but not limited to bleeding, infection, perforation, recurrence, risk of needing further surgery, etc. The anesthetic risks included heart attacks, strokes, pneumonia, phlebitis, etc.  He is willing to accept all risks and proceed with surgery. No guarantees have been given. Return for Post-operative Visit.     DO Jaguar 37/3/3838 9:48 AM

## 2020-12-01 NOTE — LETTER
SCHEDULING INSTRUCTIONS:     Patient: Amy Stewart  : 1990    Hospital: Hospital    Admitting Physician:  Dr. Kasey Jasso    Diagnosis: Bilateral Inguinal Hernias, Reducible; umbilical hernia, reducible     Procedure: Robotic Assisted Bilateral Inguinal Hernia Repair with Mesh, Umbilical Hernia Repair     ALLOW ADDITIONAL 30 MINS FOR TURNOVER EXCEPT FOR PHYSICIAN'S BOUNCE DAY    Anesthesia: General    Admission:Outpatient     Date: 2020               NOT TO BE USED OUTSIDE OF THE OFFICE

## 2020-12-14 ENCOUNTER — HOSPITAL ENCOUNTER (OUTPATIENT)
Dept: PREADMISSION TESTING | Age: 30
Discharge: HOME | End: 2020-12-18
Payer: OTHER GOVERNMENT

## 2020-12-14 VITALS — HEIGHT: 70 IN | BODY MASS INDEX: 28.63 KG/M2 | WEIGHT: 200 LBS

## 2020-12-14 PROCEDURE — 87081 CULTURE SCREEN ONLY: CPT

## 2020-12-15 LAB
MRSA CULTURE ONLY: ABNORMAL
ORGANISM: ABNORMAL
SARS-COV-2, NAA: NOT DETECTED

## 2020-12-31 ENCOUNTER — TELEPHONE (OUTPATIENT)
Dept: SURGERY | Age: 30
End: 2020-12-31

## 2021-01-04 ENCOUNTER — TELEPHONE (OUTPATIENT)
Dept: SURGERY | Age: 31
End: 2021-01-04

## 2021-01-06 ENCOUNTER — OFFICE VISIT (OUTPATIENT)
Dept: SURGERY | Age: 31
End: 2021-01-06

## 2021-01-06 VITALS
SYSTOLIC BLOOD PRESSURE: 130 MMHG | WEIGHT: 203.2 LBS | DIASTOLIC BLOOD PRESSURE: 74 MMHG | TEMPERATURE: 98.4 F | BODY MASS INDEX: 29.09 KG/M2 | HEIGHT: 70 IN

## 2021-01-06 DIAGNOSIS — K40.20 NON-RECURRENT BILATERAL INGUINAL HERNIA WITHOUT OBSTRUCTION OR GANGRENE: ICD-10-CM

## 2021-01-06 DIAGNOSIS — K42.9 UMBILICAL HERNIA WITHOUT OBSTRUCTION AND WITHOUT GANGRENE: Primary | ICD-10-CM

## 2021-01-06 PROCEDURE — 99999 PR OFFICE/OUTPT VISIT,PROCEDURE ONLY: CPT | Performed by: SURGERY

## 2021-01-06 NOTE — H&P
111 [facility] Surgery History and Physical       [facility] Surgery History and Physical   SUBJECTIVE:  Mr. Gayla Pastrana is a 35 y.o. male who presented in December with bilateral groin that started in August after a training incident. He notes the pain is worse with increased activity in the central abdomen and bilateral groins. He has bulges in the areas but they are soft and reducible. He denies obstructive symptoms. The pain is sharp and non-radiating. Localized to the belly-button and bilateral groins. He was originally scheduled in December, but due to a death in the family he had to cancel his surgery. Today he continues to complain of groin pain, and would like to reschedule his surgery. Past Medical History:   Diagnosis Date    ADHD (attention deficit hyperactivity disorder)     Mild depression ([facility] [address] 2020     Past Surgical History:   Procedure Laterality Date    WISDOM TOOTH EXTRACTION       Current Outpatient Medications   Medication Sig Dispense Refill    Multiple Vitamins-Minerals (MULTIVITAMIN ADULT PO) Take by mouth      lisdexamfetamine (VYVANSE) 30 MG capsule Take 1 capsule by mouth every morning for 30 days. 30 capsule 0     No current facility-administered medications for this visit.       Allergies: Albuterol    Family History   Problem Relation Age of Onset    Diabetes Mother     Obesity Mother     High Cholesterol Mother     Obesity Father     Cancer Maternal Grandmother         leukemia    Stroke Maternal Grandmother     Breast Cancer Paternal Grandmother     Heart Attack Paternal Grandfather     Alcohol Abuse Paternal Grandfather        Social History     Tobacco Use    Smoking status: Former Smoker     Packs/day: 0.50     Years: 3.00     Pack years: 1.50     Types: Cigarettes     Last attempt to quit: 2018     Years since quittin.5    Smokeless tobacco: Current User     Types: Snuff   Substance Use Topics    Alcohol use: Yes       Review of Systems Constitutional: Positive for activity change and unexpected weight change. Negative for fatigue and fever. HENT: Negative for hearing loss, nosebleeds and sore throat. Eyes: Negative for pain, redness and visual disturbance. Respiratory: Negative for cough, chest tightness and shortness of breath. Cardiovascular: Negative for chest pain, palpitations and leg swelling. Gastrointestinal: Positive for abdominal pain. Negative for abdominal distention, constipation, diarrhea, nausea and vomiting. Endocrine: Negative for cold intolerance, heat intolerance and polydipsia. Genitourinary: Negative for difficulty urinating, frequency and urgency. Musculoskeletal: Positive for myalgias. Negative for back pain, joint swelling and neck pain. Skin: Negative for color change, rash and wound. Allergic/Immunologic: Negative for environmental allergies and food allergies. Neurological: Negative for seizures, light-headedness and headaches. Hematological: Negative for adenopathy. Does not bruise/bleed easily. Psychiatric/Behavioral: Negative for confusion, sleep disturbance and suicidal ideas. OBJECTIVE:  /74 (Site: Left Upper Arm, Position: Sitting, Cuff Size: Large Adult)   Temp 98.5 °F (36.9 °C) (Temporal)   Ht 5' 10\" (1.778 m)   Wt 204 lb 12.8 oz (92.9 kg)   BMI 29.39 kg/m²   Physical Exam  Vitals signs reviewed. Constitutional:       Appearance: He is well-developed. HENT:      Head: Normocephalic and atraumatic. Eyes:      Pupils: Pupils are equal, round, and reactive to light. Neck:      Musculoskeletal: Normal range of motion and neck supple. Cardiovascular:      Rate and Rhythm: Normal rate and regular rhythm. Heart sounds: Normal heart sounds. Pulmonary:      Effort: Pulmonary effort is normal.      Breath sounds: Normal breath sounds. No wheezing or rales. Abdominal:      General: There is no distension. Palpations: Abdomen is soft. There is no mass.

## 2021-01-06 NOTE — PROGRESS NOTES
111 [facility] Surgery History and Physical   SUBJECTIVE:  Mr. Jordan Negrete is a 35 y.o. male who presented in December with bilateral groin that started in August after a training incident. He notes the pain is worse with increased activity in the central abdomen and bilateral groins. He has bulges in the areas but they are soft and reducible. He denies obstructive symptoms. The pain is sharp and non-radiating. Localized to the belly-button and bilateral groins. He was originally scheduled in December, but due to a death in the family he had to cancel his surgery. Today he continues to complain of groin pain, and would like to reschedule his surgery. Past Medical History:   Diagnosis Date    ADHD (attention deficit hyperactivity disorder)     Mild depression ([address] 2020     Past Surgical History:   Procedure Laterality Date    WISDOM TOOTH EXTRACTION       Current Outpatient Medications   Medication Sig Dispense Refill    Multiple Vitamins-Minerals (MULTIVITAMIN ADULT PO) Take by mouth      lisdexamfetamine (VYVANSE) 30 MG capsule Take 1 capsule by mouth every morning for 30 days. 30 capsule 0     No current facility-administered medications for this visit.       Allergies: Albuterol    Family History   Problem Relation Age of Onset    Diabetes Mother     Obesity Mother     High Cholesterol Mother     Obesity Father     Cancer Maternal Grandmother         leukemia    Stroke Maternal Grandmother     Breast Cancer Paternal Grandmother     Heart Attack Paternal Grandfather     Alcohol Abuse Paternal Grandfather        Social History     Tobacco Use    Smoking status: Former Smoker     Packs/day: 0.50     Years: 3.00     Pack years: 1.50     Types: Cigarettes     Last attempt to quit: 2018     Years since quittin.5    Smokeless tobacco: Current User     Types: Snuff   Substance Use Topics    Alcohol use: Yes       Review of Systems Constitutional: Positive for activity change and unexpected weight change. Negative for fatigue and fever. HENT: Negative for hearing loss, nosebleeds and sore throat. Eyes: Negative for pain, redness and visual disturbance. Respiratory: Negative for cough, chest tightness and shortness of breath. Cardiovascular: Negative for chest pain, palpitations and leg swelling. Gastrointestinal: Positive for abdominal pain. Negative for abdominal distention, constipation, diarrhea, nausea and vomiting. Endocrine: Negative for cold intolerance, heat intolerance and polydipsia. Genitourinary: Negative for difficulty urinating, frequency and urgency. Musculoskeletal: Positive for myalgias. Negative for back pain, joint swelling and neck pain. Skin: Negative for color change, rash and wound. Allergic/Immunologic: Negative for environmental allergies and food allergies. Neurological: Negative for seizures, light-headedness and headaches. Hematological: Negative for adenopathy. Does not bruise/bleed easily. Psychiatric/Behavioral: Negative for confusion, sleep disturbance and suicidal ideas. OBJECTIVE:  /74 (Site: Left Upper Arm, Position: Sitting, Cuff Size: Large Adult)   Temp 98.5 °F (36.9 °C) (Temporal)   Ht 5' 10\" (1.778 m)   Wt 204 lb 12.8 oz (92.9 kg)   BMI 29.39 kg/m²   Physical Exam  Vitals signs reviewed. Constitutional:       Appearance: He is well-developed. HENT:      Head: Normocephalic and atraumatic. Eyes:      Pupils: Pupils are equal, round, and reactive to light. Neck:      Musculoskeletal: Normal range of motion and neck supple. Cardiovascular:      Rate and Rhythm: Normal rate and regular rhythm. Heart sounds: Normal heart sounds. Pulmonary:      Effort: Pulmonary effort is normal.      Breath sounds: Normal breath sounds. No wheezing or rales. Abdominal:      General: There is no distension. Palpations: Abdomen is soft. There is no mass. Tenderness: There is no abdominal tenderness. There is no guarding or rebound. Hernia: A hernia is present. Hernia is present in the umbilical area, left inguinal area and right inguinal area. Comments: Diastasis to the upper abdomen. Small, reducible umbilical hernia. Musculoskeletal: Normal range of motion. Lymphadenopathy:      Cervical: No cervical adenopathy. Skin:     General: Skin is warm and dry. Neurological:      Mental Status: He is alert and oriented to person, place, and time. Psychiatric:         Behavior: Behavior normal.         Thought Content: Thought content normal.         Judgment: Judgment normal.         ASSESSMENT:   Diagnosis Orders   1. Non-recurrent bilateral inguinal hernia without obstruction or gangrene     2. Umbilical hernia without obstruction and without gangrene         PLAN:  The risks, benefits, and alternatives were discussed with the pt. He is willing to accept the risks and proceed with a robotic bilateral inguinal hernia repair with mesh. The surgical risks included but not limited to bleeding, infection, perforation, recurrence, risk of needing further surgery, etc. The anesthetic risks included heart attacks, strokes, pneumonia, phlebitis, etc.  He is willing to accept all risks and proceed with surgery. No guarantees have been given. Return for Post-operative Visit.     Renae Penn DO  9/5/51  11:26 AM

## 2021-01-06 NOTE — H&P (VIEW-ONLY)
111 [facility] Surgery History and Physical       [facility] Surgery History and Physical   SUBJECTIVE:  Mr. Kendrick Gutierrez is a 35 y.o. male who presented in December with bilateral groin that started in August after a training incident. He notes the pain is worse with increased activity in the central abdomen and bilateral groins. He has bulges in the areas but they are soft and reducible. He denies obstructive symptoms. The pain is sharp and non-radiating. Localized to the belly-button and bilateral groins. He was originally scheduled in December, but due to a death in the family he had to cancel his surgery. Today he continues to complain of groin pain, and would like to reschedule his surgery. Past Medical History:   Diagnosis Date    ADHD (attention deficit hyperactivity disorder)     Mild depression ([facility] [address] 2020     Past Surgical History:   Procedure Laterality Date    WISDOM TOOTH EXTRACTION       Current Outpatient Medications   Medication Sig Dispense Refill    Multiple Vitamins-Minerals (MULTIVITAMIN ADULT PO) Take by mouth      lisdexamfetamine (VYVANSE) 30 MG capsule Take 1 capsule by mouth every morning for 30 days. 30 capsule 0     No current facility-administered medications for this visit.       Allergies: Albuterol    Family History   Problem Relation Age of Onset    Diabetes Mother     Obesity Mother     High Cholesterol Mother     Obesity Father     Cancer Maternal Grandmother         leukemia    Stroke Maternal Grandmother     Breast Cancer Paternal Grandmother     Heart Attack Paternal Grandfather     Alcohol Abuse Paternal Grandfather        Social History     Tobacco Use    Smoking status: Former Smoker     Packs/day: 0.50     Years: 3.00     Pack years: 1.50     Types: Cigarettes     Last attempt to quit: 2018     Years since quittin.5    Smokeless tobacco: Current User     Types: Snuff   Substance Use Topics    Alcohol use: Yes       Review of Systems Tenderness: There is no abdominal tenderness. There is no guarding or rebound. Hernia: A hernia is present. Hernia is present in the umbilical area, left inguinal area and right inguinal area. Comments: Diastasis to the upper abdomen. Small, reducible umbilical hernia. Musculoskeletal: Normal range of motion. Lymphadenopathy:      Cervical: No cervical adenopathy. Skin:     General: Skin is warm and dry. Neurological:      Mental Status: He is alert and oriented to person, place, and time. Psychiatric:         Behavior: Behavior normal.         Thought Content: Thought content normal.         Judgment: Judgment normal.         ASSESSMENT:   Diagnosis Orders   1. Non-recurrent bilateral inguinal hernia without obstruction or gangrene     2. Umbilical hernia without obstruction and without gangrene         PLAN:  The risks, benefits, and alternatives were discussed with the pt. He is willing to accept the risks and proceed with a robotic bilateral inguinal hernia repair with mesh. The surgical risks included but not limited to bleeding, infection, perforation, recurrence, risk of needing further surgery, etc. The anesthetic risks included heart attacks, strokes, pneumonia, phlebitis, etc.  He is willing to accept all risks and proceed with surgery. No guarantees have been given. Return for Post-operative Visit.     DO Jaguar  1/5/65  11:26 AM

## 2021-01-06 NOTE — LETTER
SCHEDULING INSTRUCTIONS:     Patient: Magnus Guthrie  : 1990    Hospital: Hospital    Admitting Physician:  Dr. Xiao Larkin    Diagnosis: Bilateral Inguinal Hernias, Reducible; umbilical hernia, reducible     Procedure: Robotic Assisted Bilateral Inguinal Hernia Repair with Mesh, Umbilical Hernia Repair     ALLOW ADDITIONAL 30 MINS FOR TURNOVER EXCEPT FOR PHYSICIAN'S BOUNCE DAY    Anesthesia: General    Admission:Outpatient     Date: 2021               NOT TO BE USED OUTSIDE OF THE OFFICE

## 2021-01-08 ENCOUNTER — VIRTUAL VISIT (OUTPATIENT)
Dept: FAMILY MEDICINE CLINIC | Age: 31
End: 2021-01-08
Payer: COMMERCIAL

## 2021-01-08 DIAGNOSIS — F32.A MILD DEPRESSION: ICD-10-CM

## 2021-01-08 DIAGNOSIS — F90.0 ATTENTION DEFICIT HYPERACTIVITY DISORDER (ADHD), PREDOMINANTLY INATTENTIVE TYPE: Primary | ICD-10-CM

## 2021-01-08 PROCEDURE — 99441 PR PHYS/QHP TELEPHONE EVALUATION 5-10 MIN: CPT | Performed by: CLINICAL NURSE SPECIALIST

## 2021-01-08 RX ORDER — BUPROPION HYDROCHLORIDE 150 MG/1
150 TABLET ORAL EVERY MORNING
Qty: 90 TABLET | Refills: 1 | Status: SHIPPED | OUTPATIENT
Start: 2021-01-08 | End: 2021-04-06 | Stop reason: ALTCHOICE
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
## 2021-01-20 ENCOUNTER — HOSPITAL ENCOUNTER (OUTPATIENT)
Dept: PREADMISSION TESTING | Age: 31
Discharge: HOME | End: 2021-01-24
Payer: COMMERCIAL

## 2021-01-20 VITALS — HEIGHT: 70 IN | WEIGHT: 200 LBS | BODY MASS INDEX: 28.63 KG/M2

## 2021-01-20 PROCEDURE — 87081 CULTURE SCREEN ONLY: CPT

## 2021-01-21 LAB
MRSA CULTURE ONLY: ABNORMAL
ORGANISM: ABNORMAL
SARS-COV-2, NAA: NOT DETECTED

## 2021-01-22 ENCOUNTER — TELEPHONE (OUTPATIENT)
Dept: SURGERY | Age: 31
End: 2021-01-22

## 2021-01-22 DIAGNOSIS — Z22.322 MRSA CARRIER: Primary | ICD-10-CM

## 2021-01-22 NOTE — TELEPHONE ENCOUNTER
----- Message from Pascual Forman DO sent at 1/22/2021 10:29 AM CST -----  Regarding: Call Patient  Please call the patient and let him know his MRSA culture was positive. This does not delay the surgery, but he does need to be treated. I have sent a rx for him to [facility], that I'd like for him to  and start today, please.     Thanks,  DO Jaguar

## 2021-01-25 ENCOUNTER — HOSPITAL ENCOUNTER (OUTPATIENT)
Age: 31
Setting detail: OUTPATIENT SURGERY
Discharge: HOME | End: 2021-01-25
Attending: SURGERY | Admitting: SURGERY
Payer: COMMERCIAL

## 2021-01-25 ENCOUNTER — ANESTHESIA (OUTPATIENT)
Dept: OPERATING ROOM | Age: 31
End: 2021-01-25
Payer: COMMERCIAL

## 2021-01-25 ENCOUNTER — ANESTHESIA EVENT (OUTPATIENT)
Dept: OPERATING ROOM | Age: 31
End: 2021-01-25
Payer: COMMERCIAL

## 2021-01-25 VITALS
OXYGEN SATURATION: 96 % | SYSTOLIC BLOOD PRESSURE: 134 MMHG | TEMPERATURE: 97 F | RESPIRATION RATE: 16 BRPM | DIASTOLIC BLOOD PRESSURE: 74 MMHG

## 2021-01-25 VITALS
OXYGEN SATURATION: 97 % | RESPIRATION RATE: 16 BRPM | TEMPERATURE: 97.1 F | HEART RATE: 75 BPM | HEIGHT: 70 IN | BODY MASS INDEX: 30.06 KG/M2 | DIASTOLIC BLOOD PRESSURE: 78 MMHG | SYSTOLIC BLOOD PRESSURE: 121 MMHG | WEIGHT: 210 LBS

## 2021-01-25 DIAGNOSIS — K42.9 UMBILICAL HERNIA WITHOUT OBSTRUCTION AND WITHOUT GANGRENE: ICD-10-CM

## 2021-01-25 DIAGNOSIS — K40.20 NON-RECURRENT BILATERAL INGUINAL HERNIA WITHOUT OBSTRUCTION OR GANGRENE: Primary | Chronic | ICD-10-CM

## 2021-01-25 PROCEDURE — 2709999900 HC NON-CHARGEABLE SUPPLY: Performed by: SURGERY

## 2021-01-25 PROCEDURE — 6360000002 HC RX W HCPCS: Performed by: SURGERY

## 2021-01-25 PROCEDURE — S2900 ROBOTIC SURGICAL SYSTEM: HCPCS | Performed by: SURGERY

## 2021-01-25 PROCEDURE — 6360000002 HC RX W HCPCS: Performed by: NURSE ANESTHETIST, CERTIFIED REGISTERED

## 2021-01-25 PROCEDURE — 7100000001 HC PACU RECOVERY - ADDTL 15 MIN: Performed by: SURGERY

## 2021-01-25 PROCEDURE — 3600000019 HC SURGERY ROBOT ADDTL 15MIN: Performed by: SURGERY

## 2021-01-25 PROCEDURE — 6360000002 HC RX W HCPCS: Performed by: ANESTHESIOLOGY

## 2021-01-25 PROCEDURE — 6370000000 HC RX 637 (ALT 250 FOR IP): Performed by: SURGERY

## 2021-01-25 PROCEDURE — 7100000000 HC PACU RECOVERY - FIRST 15 MIN: Performed by: SURGERY

## 2021-01-25 PROCEDURE — 3700000000 HC ANESTHESIA ATTENDED CARE: Performed by: SURGERY

## 2021-01-25 PROCEDURE — 7100000010 HC PHASE II RECOVERY - FIRST 15 MIN: Performed by: SURGERY

## 2021-01-25 PROCEDURE — C1781 MESH (IMPLANTABLE): HCPCS | Performed by: SURGERY

## 2021-01-25 PROCEDURE — 2500000003 HC RX 250 WO HCPCS: Performed by: NURSE ANESTHETIST, CERTIFIED REGISTERED

## 2021-01-25 PROCEDURE — 2580000003 HC RX 258: Performed by: SURGERY

## 2021-01-25 PROCEDURE — 3600000009 HC SURGERY ROBOT BASE: Performed by: SURGERY

## 2021-01-25 PROCEDURE — 49650 LAP ING HERNIA REPAIR INIT: CPT | Performed by: SURGERY

## 2021-01-25 PROCEDURE — 2500000003 HC RX 250 WO HCPCS: Performed by: SURGERY

## 2021-01-25 PROCEDURE — 3700000001 HC ADD 15 MINUTES (ANESTHESIA): Performed by: SURGERY

## 2021-01-25 PROCEDURE — C9290 INJ, BUPIVACAINE LIPOSOME: HCPCS | Performed by: SURGERY

## 2021-01-25 PROCEDURE — 49585 REPAIR UMBILICAL HERN,5+Y/O,REDUC: CPT | Performed by: SURGERY

## 2021-01-25 DEVICE — MESH HERN W10XL15CM POLY POLYLACTIC ACID 70% CLLGN 30% GLYC: Type: IMPLANTABLE DEVICE | Site: GROIN | Status: FUNCTIONAL

## 2021-01-25 RX ORDER — MEPERIDINE HYDROCHLORIDE 50 MG/ML
12.5 INJECTION INTRAMUSCULAR; INTRAVENOUS; SUBCUTANEOUS EVERY 5 MIN PRN
Status: DISCONTINUED | OUTPATIENT
Start: 2021-01-25 | End: 2021-01-25 | Stop reason: HOSPADM

## 2021-01-25 RX ORDER — DIPHENHYDRAMINE HYDROCHLORIDE 50 MG/ML
12.5 INJECTION INTRAMUSCULAR; INTRAVENOUS
Status: DISCONTINUED | OUTPATIENT
Start: 2021-01-25 | End: 2021-01-25 | Stop reason: HOSPADM

## 2021-01-25 RX ORDER — MORPHINE SULFATE 4 MG/ML
2 INJECTION, SOLUTION INTRAMUSCULAR; INTRAVENOUS EVERY 5 MIN PRN
Status: DISCONTINUED | OUTPATIENT
Start: 2021-01-25 | End: 2021-01-25 | Stop reason: HOSPADM

## 2021-01-25 RX ORDER — FENTANYL CITRATE 50 UG/ML
50 INJECTION, SOLUTION INTRAMUSCULAR; INTRAVENOUS
Status: DISCONTINUED | OUTPATIENT
Start: 2021-01-25 | End: 2021-01-25 | Stop reason: HOSPADM

## 2021-01-25 RX ORDER — MORPHINE SULFATE 4 MG/ML
4 INJECTION, SOLUTION INTRAMUSCULAR; INTRAVENOUS
Status: DISCONTINUED | OUTPATIENT
Start: 2021-01-25 | End: 2021-01-25 | Stop reason: HOSPADM

## 2021-01-25 RX ORDER — METOCLOPRAMIDE HYDROCHLORIDE 5 MG/ML
10 INJECTION INTRAMUSCULAR; INTRAVENOUS
Status: COMPLETED | OUTPATIENT
Start: 2021-01-25 | End: 2021-01-25

## 2021-01-25 RX ORDER — HYDROMORPHONE HYDROCHLORIDE 1 MG/ML
0.25 INJECTION, SOLUTION INTRAMUSCULAR; INTRAVENOUS; SUBCUTANEOUS EVERY 5 MIN PRN
Status: DISCONTINUED | OUTPATIENT
Start: 2021-01-25 | End: 2021-01-25 | Stop reason: HOSPADM

## 2021-01-25 RX ORDER — LIDOCAINE HYDROCHLORIDE 10 MG/ML
INJECTION, SOLUTION EPIDURAL; INFILTRATION; INTRACAUDAL; PERINEURAL PRN
Status: DISCONTINUED | OUTPATIENT
Start: 2021-01-25 | End: 2021-01-25 | Stop reason: SDUPTHER

## 2021-01-25 RX ORDER — OXYCODONE HYDROCHLORIDE AND ACETAMINOPHEN 5; 325 MG/1; MG/1
1 TABLET ORAL ONCE
Status: COMPLETED | OUTPATIENT
Start: 2021-01-25 | End: 2021-01-25

## 2021-01-25 RX ORDER — SODIUM CHLORIDE, SODIUM LACTATE, POTASSIUM CHLORIDE, CALCIUM CHLORIDE 600; 310; 30; 20 MG/100ML; MG/100ML; MG/100ML; MG/100ML
INJECTION, SOLUTION INTRAVENOUS CONTINUOUS
Status: DISCONTINUED | OUTPATIENT
Start: 2021-01-25 | End: 2021-01-25 | Stop reason: HOSPADM

## 2021-01-25 RX ORDER — LABETALOL HYDROCHLORIDE 5 MG/ML
5 INJECTION, SOLUTION INTRAVENOUS EVERY 10 MIN PRN
Status: DISCONTINUED | OUTPATIENT
Start: 2021-01-25 | End: 2021-01-25 | Stop reason: HOSPADM

## 2021-01-25 RX ORDER — SCOLOPAMINE TRANSDERMAL SYSTEM 1 MG/1
1 PATCH, EXTENDED RELEASE TRANSDERMAL ONCE
Status: DISCONTINUED | OUTPATIENT
Start: 2021-01-25 | End: 2021-01-25 | Stop reason: HOSPADM

## 2021-01-25 RX ORDER — SODIUM CHLORIDE 0.9 % (FLUSH) 0.9 %
10 SYRINGE (ML) INJECTION PRN
Status: DISCONTINUED | OUTPATIENT
Start: 2021-01-25 | End: 2021-01-25 | Stop reason: HOSPADM

## 2021-01-25 RX ORDER — ONDANSETRON 2 MG/ML
INJECTION INTRAMUSCULAR; INTRAVENOUS PRN
Status: DISCONTINUED | OUTPATIENT
Start: 2021-01-25 | End: 2021-01-25 | Stop reason: SDUPTHER

## 2021-01-25 RX ORDER — SODIUM CHLORIDE 0.9 % (FLUSH) 0.9 %
10 SYRINGE (ML) INJECTION EVERY 12 HOURS SCHEDULED
Status: DISCONTINUED | OUTPATIENT
Start: 2021-01-25 | End: 2021-01-25 | Stop reason: HOSPADM

## 2021-01-25 RX ORDER — PROPOFOL 10 MG/ML
INJECTION, EMULSION INTRAVENOUS PRN
Status: DISCONTINUED | OUTPATIENT
Start: 2021-01-25 | End: 2021-01-25 | Stop reason: SDUPTHER

## 2021-01-25 RX ORDER — HYDROMORPHONE HYDROCHLORIDE 1 MG/ML
0.5 INJECTION, SOLUTION INTRAMUSCULAR; INTRAVENOUS; SUBCUTANEOUS EVERY 5 MIN PRN
Status: DISCONTINUED | OUTPATIENT
Start: 2021-01-25 | End: 2021-01-25 | Stop reason: HOSPADM

## 2021-01-25 RX ORDER — HEPARIN SODIUM 5000 [USP'U]/ML
5000 INJECTION, SOLUTION INTRAVENOUS; SUBCUTANEOUS ONCE
Status: COMPLETED | OUTPATIENT
Start: 2021-01-25 | End: 2021-01-25

## 2021-01-25 RX ORDER — FENTANYL CITRATE 50 UG/ML
INJECTION, SOLUTION INTRAMUSCULAR; INTRAVENOUS PRN
Status: DISCONTINUED | OUTPATIENT
Start: 2021-01-25 | End: 2021-01-25 | Stop reason: SDUPTHER

## 2021-01-25 RX ORDER — PROMETHAZINE HYDROCHLORIDE 25 MG/ML
6.25 INJECTION, SOLUTION INTRAMUSCULAR; INTRAVENOUS
Status: DISCONTINUED | OUTPATIENT
Start: 2021-01-25 | End: 2021-01-25 | Stop reason: HOSPADM

## 2021-01-25 RX ORDER — HYDRALAZINE HYDROCHLORIDE 20 MG/ML
5 INJECTION INTRAMUSCULAR; INTRAVENOUS EVERY 10 MIN PRN
Status: DISCONTINUED | OUTPATIENT
Start: 2021-01-25 | End: 2021-01-25 | Stop reason: HOSPADM

## 2021-01-25 RX ORDER — OXYCODONE HYDROCHLORIDE AND ACETAMINOPHEN 5; 325 MG/1; MG/1
1 TABLET ORAL EVERY 6 HOURS PRN
Qty: 20 TABLET | Refills: 0 | Status: SHIPPED | OUTPATIENT
Start: 2021-01-25 | End: 2021-01-30

## 2021-01-25 RX ORDER — MIDAZOLAM HYDROCHLORIDE 1 MG/ML
2 INJECTION INTRAMUSCULAR; INTRAVENOUS
Status: COMPLETED | OUTPATIENT
Start: 2021-01-25 | End: 2021-01-25

## 2021-01-25 RX ORDER — DEXAMETHASONE SODIUM PHOSPHATE 10 MG/ML
INJECTION, SOLUTION INTRAMUSCULAR; INTRAVENOUS PRN
Status: DISCONTINUED | OUTPATIENT
Start: 2021-01-25 | End: 2021-01-25 | Stop reason: SDUPTHER

## 2021-01-25 RX ORDER — ENALAPRILAT 2.5 MG/2ML
1.25 INJECTION INTRAVENOUS
Status: DISCONTINUED | OUTPATIENT
Start: 2021-01-25 | End: 2021-01-25 | Stop reason: HOSPADM

## 2021-01-25 RX ORDER — LIDOCAINE HYDROCHLORIDE 10 MG/ML
1 INJECTION, SOLUTION EPIDURAL; INFILTRATION; INTRACAUDAL; PERINEURAL
Status: DISCONTINUED | OUTPATIENT
Start: 2021-01-25 | End: 2021-01-25 | Stop reason: HOSPADM

## 2021-01-25 RX ORDER — MORPHINE SULFATE 4 MG/ML
4 INJECTION, SOLUTION INTRAMUSCULAR; INTRAVENOUS EVERY 5 MIN PRN
Status: DISCONTINUED | OUTPATIENT
Start: 2021-01-25 | End: 2021-01-25 | Stop reason: HOSPADM

## 2021-01-25 RX ORDER — ROCURONIUM BROMIDE 10 MG/ML
INJECTION, SOLUTION INTRAVENOUS PRN
Status: DISCONTINUED | OUTPATIENT
Start: 2021-01-25 | End: 2021-01-25 | Stop reason: SDUPTHER

## 2021-01-25 RX ADMIN — ROCURONIUM BROMIDE 100 MG: 10 INJECTION, SOLUTION INTRAVENOUS at 08:13

## 2021-01-25 RX ADMIN — METOCLOPRAMIDE 10 MG: 5 INJECTION, SOLUTION INTRAMUSCULAR; INTRAVENOUS at 11:35

## 2021-01-25 RX ADMIN — MIDAZOLAM 2 MG: 1 INJECTION INTRAMUSCULAR; INTRAVENOUS at 07:06

## 2021-01-25 RX ADMIN — MEPERIDINE HYDROCHLORIDE 12.5 MG: 50 INJECTION, SOLUTION INTRAMUSCULAR; INTRAVENOUS; SUBCUTANEOUS at 10:27

## 2021-01-25 RX ADMIN — SODIUM CHLORIDE, SODIUM LACTATE, POTASSIUM CHLORIDE, AND CALCIUM CHLORIDE: 600; 310; 30; 20 INJECTION, SOLUTION INTRAVENOUS at 09:53

## 2021-01-25 RX ADMIN — SUGAMMADEX 200 MG: 100 INJECTION, SOLUTION INTRAVENOUS at 09:45

## 2021-01-25 RX ADMIN — FENTANYL CITRATE 25 MCG: 50 INJECTION, SOLUTION INTRAMUSCULAR; INTRAVENOUS at 10:05

## 2021-01-25 RX ADMIN — FENTANYL CITRATE 100 MCG: 50 INJECTION, SOLUTION INTRAMUSCULAR; INTRAVENOUS at 08:13

## 2021-01-25 RX ADMIN — OXYCODONE AND ACETAMINOPHEN 1 TABLET: 5; 325 TABLET ORAL at 11:35

## 2021-01-25 RX ADMIN — LIDOCAINE HYDROCHLORIDE 50 MG: 10 INJECTION, SOLUTION EPIDURAL; INFILTRATION; INTRACAUDAL; PERINEURAL at 08:13

## 2021-01-25 RX ADMIN — PROPOFOL 200 MG: 10 INJECTION, EMULSION INTRAVENOUS at 08:13

## 2021-01-25 RX ADMIN — SODIUM CHLORIDE, SODIUM LACTATE, POTASSIUM CHLORIDE, AND CALCIUM CHLORIDE: 600; 310; 30; 20 INJECTION, SOLUTION INTRAVENOUS at 06:44

## 2021-01-25 RX ADMIN — DEXAMETHASONE SODIUM PHOSPHATE 10 MG: 10 INJECTION, SOLUTION INTRAMUSCULAR; INTRAVENOUS at 08:27

## 2021-01-25 RX ADMIN — ONDANSETRON HYDROCHLORIDE 4 MG: 2 INJECTION, SOLUTION INTRAMUSCULAR; INTRAVENOUS at 09:45

## 2021-01-25 RX ADMIN — Medication 2 G: at 08:21

## 2021-01-25 RX ADMIN — HEPARIN SODIUM 5000 UNITS: 5000 INJECTION INTRAVENOUS; SUBCUTANEOUS at 06:56

## 2021-01-25 RX ADMIN — FENTANYL CITRATE 25 MCG: 50 INJECTION, SOLUTION INTRAMUSCULAR; INTRAVENOUS at 09:15

## 2021-01-25 RX ADMIN — FENTANYL CITRATE 50 MCG: 50 INJECTION, SOLUTION INTRAMUSCULAR; INTRAVENOUS at 08:16

## 2021-01-25 RX ADMIN — FENTANYL CITRATE 50 MCG: 50 INJECTION, SOLUTION INTRAMUSCULAR; INTRAVENOUS at 08:29

## 2021-01-25 ASSESSMENT — PAIN SCALES - GENERAL
PAINLEVEL_OUTOF10: 0
PAINLEVEL_OUTOF10: 6

## 2021-01-25 ASSESSMENT — LIFESTYLE VARIABLES: SMOKING_STATUS: 0

## 2021-01-25 ASSESSMENT — PAIN DESCRIPTION - ORIENTATION: ORIENTATION: LOWER

## 2021-01-25 ASSESSMENT — PAIN DESCRIPTION - PAIN TYPE: TYPE: SURGICAL PAIN

## 2021-01-25 ASSESSMENT — PAIN DESCRIPTION - LOCATION: LOCATION: ABDOMEN

## 2021-01-25 NOTE — INTERVAL H&P NOTE
Update History & Physical    The patient's History and Physical of January 6, 2021 was reviewed with the patient and I examined the patient. There was no change. The surgical site was confirmed by the patient and me. Plan: The risks, benefits, expected outcome, and alternative to the recommended procedure have been discussed with the patient. Patient understands and wants to proceed with the procedure.      Electronically signed by Moody Hallman DO on 3/54/9547 at 8:01 AM

## 2021-01-25 NOTE — ANESTHESIA POSTPROCEDURE EVALUATION
Department of Anesthesiology  Postprocedure Note    Patient: Tova Murillo  MRN: [id number]  YOB: 1990  Date of evaluation: 1/25/2021  Time:  10:10 AM     Procedure Summary     Date: 01/25/21 Room / Location: [address]    Anesthesia Start: 1735 Anesthesia Stop: 1010    Procedure: ROBOTIC ASSISTED BILATERAL INGUINAL HERNIA REPAIR WITH MESH, UMBILICAL HERNIA REPAIR (Bilateral Groin) Diagnosis: (BILATERAL INGUINAL HERNIAS, REDUCIBLE; UMBILICAL HERNIA, REDUCIBLE)    Surgeons: Yamil Ricks DO Responsible Provider: NELLY Tirado CRNA    Anesthesia Type: general ASA Status: 2          Anesthesia Type: general    Amrita Phase I: Amrita Score: 10    Amrita Phase II:      Last vitals: Reviewed and per EMR flowsheets.        Anesthesia Post Evaluation    Patient location during evaluation: PACU  Patient participation: complete - patient participated  Level of consciousness: obtunded/minimal responses  Pain score: 0  Airway patency: patent  Nausea & Vomiting: no vomiting and no nausea  Complications: no  Cardiovascular status: hemodynamically stable  Respiratory status: acceptable, oral airway and face mask  Hydration status: stable  Comments: T 97.1
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
## 2021-01-25 NOTE — DISCHARGE INSTR - ACTIVITY
No heavy lifting. No lifting more than 15 pounds for 6 weeks. No work for 2 weeks. Wear Scrotal Support/tight fitting undergarments at all times when up and about. May shower tomorrow. Do not soak in tub. No swimming pools, oceans, lakes, etc for 2 weeks. Do not drive while on pain medications. Do not get constipated. Take colace, miralax, etc as needed. Smoking increases your risk for wound infection and additional complications.

## 2021-01-25 NOTE — OP NOTE
Operative Note      Patient: Marvin Clemons  YOB: 1990  MRN: [id number]    Date of Procedure: 1/25/2021    Pre-Op Diagnosis: BILATERAL INGUINAL HERNIAS, REDUCIBLE; UMBILICAL HERNIA, REDUCIBLE    Post-Op Diagnosis: Bilateral Indirect Inguinal Hernias, reducible, non-recurrent; umbilical hernia       Procedure(s):  ROBOTIC ASSISTED BILATERAL INGUINAL HERNIA REPAIR WITH MESH, UMBILICAL HERNIA REPAIR    Surgeon(s):  Leonard Villagomez DO    Assistant:   * No surgical staff found *    Anesthesia: General    Estimated Blood Loss (mL): Minimal    Complications: None    Specimens:   * No specimens in log *    Implants:  Implant Name Type Inv. Item Serial No.  Lot No. LRB No. Used Action   MESH BARRERA [id number] POLY POLYLACTIC ACID 70% CLLGN 30% GLYC  MESH BARRERA [id number] POLY POLYLACTIC ACID 70% CLLGN 30% GLYC  [employer]  SURGICAL- [id number] Left 1 Implanted   MESH BARRERA [id number] POLY POLYLACTIC ACID 70% CLLGN 30% GLYC  MESH BARRERA [id number] POLY POLYLACTIC ACID 70% CLLGN 30% GLYC  [employer]  SURGICAL-WD [id number] Right 1 Implanted         Drains: * No LDAs found *    Findings: bilateral indirect inguinal hernias containing fat; tiny umbilical hernia    Detailed Description of Procedure:   Mr. Cherelle Lott was taken to the main operating room, placed on the operating  table supine. After the induction of adequate general endotracheal anesthesia, the abdomen was prepped and draped to a sterile field. Timeout was performed identifying correct equipment, preoperative antibiotics, and procedure. A small incision was made superior to the umbilicus, 16 cm from the ilioinguinal ligament after administration of local anesthetic into the subcutaneous tissue. This was carried down to the fascia and a size 8 robotic trocar was inserted. The abdomen was insuflatted and the patient tolerated insuflation well.   The laparoscope was advanced and inspection undertaken identifying no injury from initial trocar insertion. Two working ports were then placed under direct vision. Each were 8 mm robotic trocars, both placed 8cm lateral to the umbilical port. Upon inspection, he was found to have bilateral indirect hernias. The [employer] X operating System was delivered onto the field. Working instruments were advanced and the right groin was approached first. The lateral peritoneum was grasped and incised toward thepubic tubercle. Blunt dissection was performed and the pubic tubercle was identified and Tyrone's ligament was then cleared down to the obturator vein. Direct space was inspected without evidence of a hernia appreciated. The filmy attachments on the lateral aspect of the cord were dissected and the cord structures were identified. The preperitoneal fat pad that was traversing the indirect space was removed. The underlying cord structures were dissected and the peritoneum was dissected further into the abdomen. This was then repeated on the left side and similar findings were appreciated. There was a large fat pad traversing the ring. This was dissected and removed. The two fat pads were placed into a 5 mm endo catch bag and removed from the abdomen. The selected mesh was an ProGrip 10 x 15 cm which was delivered onto the field, one mesh for each side. It was then opened and oriented across the left groin in standard fashion, then across the right groin. The mesh was placed in excellent position bilaterally with the direct and indirect spaces nicely covered. The peritoneum was then approximated and closed with the Stratifix 6 inch suture, again, taking care to avoid the epigastric vessels and to cover the mesh entirely, on each side. The operative site was then checked and hemostasis assured. The working ports were then removed. No bleeding noted. The structural port was deflated and removed as well.      The tiny umbilical defect at the base of the umbilicus was incorporated into the fascial closure, which was was closed with one figure-of-eight 0 Vicryl suture. Skin incisions were closed with interrupted 4-0 monocryl suture, followed by Dermabond dressing. Sponge, needle and instrument count correct on 2 occasions. Estimated intraoperative blood loss minimal.     Mr. Charlotte Nicole tolerated his surgery well, and he was taken to recovery in satisfactory condition.            ________________________________  Linus Le DO        Electronically signed by Linus Le DO on 8/49/0521 at 9:49 AM

## 2021-01-27 ENCOUNTER — TELEPHONE (OUTPATIENT)
Dept: SURGERY | Age: 31
End: 2021-01-27

## 2021-01-31 ENCOUNTER — APPOINTMENT (OUTPATIENT)
Dept: CT IMAGING | Age: 31
End: 2021-01-31
Payer: COMMERCIAL

## 2021-01-31 ENCOUNTER — HOSPITAL ENCOUNTER (EMERGENCY)
Age: 31
Discharge: HOME | End: 2021-01-31
Attending: EMERGENCY MEDICINE
Payer: COMMERCIAL

## 2021-01-31 VITALS
RESPIRATION RATE: 16 BRPM | HEART RATE: 80 BPM | SYSTOLIC BLOOD PRESSURE: 110 MMHG | HEIGHT: 70 IN | OXYGEN SATURATION: 95 % | WEIGHT: 205 LBS | BODY MASS INDEX: 29.35 KG/M2 | DIASTOLIC BLOOD PRESSURE: 89 MMHG | TEMPERATURE: 97.9 F

## 2021-01-31 DIAGNOSIS — T81.40XA POSTOPERATIVE INFECTION, UNSPECIFIED TYPE, INITIAL ENCOUNTER: Primary | ICD-10-CM

## 2021-01-31 LAB
ALBUMIN SERPL-MCNC: 4.1 G/DL (ref 3.5–5.2)
ALP BLD-CCNC: 81 U/L (ref 40–130)
ALT SERPL-CCNC: 16 U/L (ref 5–41)
ANION GAP SERPL CALCULATED.3IONS-SCNC: 9 MMOL/L (ref 7–19)
AST SERPL-CCNC: 14 U/L (ref 5–40)
BASOPHILS ABSOLUTE: 0.1 K/UL (ref 0–0.2)
BASOPHILS RELATIVE PERCENT: 0.5 % (ref 0–1)
BILIRUB SERPL-MCNC: 0.3 MG/DL (ref 0.2–1.2)
BILIRUBIN URINE: NEGATIVE
BLOOD, URINE: NEGATIVE
BUN BLDV-MCNC: 12 MG/DL (ref 6–20)
CALCIUM SERPL-MCNC: 9 MG/DL (ref 8.6–10)
CHLORIDE BLD-SCNC: 99 MMOL/L (ref 98–111)
CLARITY: CLEAR
CO2: 27 MMOL/L (ref 22–29)
COLOR: YELLOW
CREAT SERPL-MCNC: 0.8 MG/DL (ref 0.5–1.2)
EOSINOPHILS ABSOLUTE: 0.2 K/UL (ref 0–0.6)
EOSINOPHILS RELATIVE PERCENT: 2.3 % (ref 0–5)
GFR AFRICAN AMERICAN: >59
GFR NON-AFRICAN AMERICAN: >60
GLUCOSE BLD-MCNC: 115 MG/DL (ref 74–109)
GLUCOSE URINE: NEGATIVE MG/DL
HCT VFR BLD CALC: 43.2 % (ref 42–52)
HEMOGLOBIN: 14.8 G/DL (ref 14–18)
IMMATURE GRANULOCYTES #: 0 K/UL
KETONES, URINE: NEGATIVE MG/DL
LACTIC ACID: 0.6 MMOL/L (ref 0.5–1.9)
LEUKOCYTE ESTERASE, URINE: NEGATIVE
LYMPHOCYTES ABSOLUTE: 2.6 K/UL (ref 1.1–4.5)
LYMPHOCYTES RELATIVE PERCENT: 26.6 % (ref 20–40)
MCH RBC QN AUTO: 32.5 PG (ref 27–31)
MCHC RBC AUTO-ENTMCNC: 34.3 G/DL (ref 33–37)
MCV RBC AUTO: 94.7 FL (ref 80–94)
MONOCYTES ABSOLUTE: 0.9 K/UL (ref 0–0.9)
MONOCYTES RELATIVE PERCENT: 8.8 % (ref 0–10)
NEUTROPHILS ABSOLUTE: 5.9 K/UL (ref 1.5–7.5)
NEUTROPHILS RELATIVE PERCENT: 61.4 % (ref 50–65)
NITRITE, URINE: NEGATIVE
PDW BLD-RTO: 12.1 % (ref 11.5–14.5)
PH UA: 7.5 (ref 5–8)
PLATELET # BLD: 208 K/UL (ref 130–400)
PMV BLD AUTO: 10.1 FL (ref 9.4–12.4)
POTASSIUM REFLEX MAGNESIUM: 3.9 MMOL/L (ref 3.5–5)
PROTEIN UA: NEGATIVE MG/DL
RBC # BLD: 4.56 M/UL (ref 4.7–6.1)
SODIUM BLD-SCNC: 135 MMOL/L (ref 136–145)
SPECIFIC GRAVITY UA: >=1.045 (ref 1–1.03)
TOTAL PROTEIN: 7 G/DL (ref 6.6–8.7)
UROBILINOGEN, URINE: 1 E.U./DL
WBC # BLD: 9.7 K/UL (ref 4.8–10.8)

## 2021-01-31 PROCEDURE — 81003 URINALYSIS AUTO W/O SCOPE: CPT

## 2021-01-31 PROCEDURE — 6360000002 HC RX W HCPCS

## 2021-01-31 PROCEDURE — 99283 EMERGENCY DEPT VISIT LOW MDM: CPT

## 2021-01-31 PROCEDURE — 74177 CT ABD & PELVIS W/CONTRAST: CPT

## 2021-01-31 PROCEDURE — 2500000003 HC RX 250 WO HCPCS

## 2021-01-31 PROCEDURE — 85025 COMPLETE CBC W/AUTO DIFF WBC: CPT

## 2021-01-31 PROCEDURE — 83605 ASSAY OF LACTIC ACID: CPT

## 2021-01-31 PROCEDURE — 2580000003 HC RX 258: Performed by: EMERGENCY MEDICINE

## 2021-01-31 PROCEDURE — 80053 COMPREHEN METABOLIC PANEL: CPT

## 2021-01-31 PROCEDURE — 6360000004 HC RX CONTRAST MEDICATION: Performed by: EMERGENCY MEDICINE

## 2021-01-31 PROCEDURE — 36415 COLL VENOUS BLD VENIPUNCTURE: CPT

## 2021-01-31 PROCEDURE — 6360000002 HC RX W HCPCS: Performed by: EMERGENCY MEDICINE

## 2021-01-31 PROCEDURE — 96374 THER/PROPH/DIAG INJ IV PUSH: CPT

## 2021-01-31 RX ORDER — OXYCODONE HYDROCHLORIDE AND ACETAMINOPHEN 5; 325 MG/1; MG/1
1 TABLET ORAL EVERY 4 HOURS PRN
COMMUNITY
End: 2021-04-06 | Stop reason: ALTCHOICE

## 2021-01-31 RX ORDER — METHYLPREDNISOLONE SODIUM SUCCINATE 125 MG/2ML
INJECTION, POWDER, LYOPHILIZED, FOR SOLUTION INTRAMUSCULAR; INTRAVENOUS
Status: COMPLETED
Start: 2021-01-31 | End: 2021-01-31

## 2021-01-31 RX ORDER — DIPHENHYDRAMINE HYDROCHLORIDE 50 MG/ML
INJECTION INTRAMUSCULAR; INTRAVENOUS
Status: COMPLETED
Start: 2021-01-31 | End: 2021-01-31

## 2021-01-31 RX ORDER — AMOXICILLIN AND CLAVULANATE POTASSIUM 875; 125 MG/1; MG/1
1 TABLET, FILM COATED ORAL 2 TIMES DAILY
Qty: 20 TABLET | Refills: 0 | Status: SHIPPED | OUTPATIENT
Start: 2021-01-31 | End: 2021-02-10

## 2021-01-31 RX ADMIN — CEFTRIAXONE 2000 MG: 1 INJECTION, POWDER, FOR SOLUTION INTRAMUSCULAR; INTRAVENOUS at 18:30

## 2021-01-31 RX ADMIN — FAMOTIDINE 20 MG: 10 INJECTION, SOLUTION INTRAVENOUS at 16:14

## 2021-01-31 RX ADMIN — IOPAMIDOL 95 ML: 755 INJECTION, SOLUTION INTRAVENOUS at 16:04

## 2021-01-31 RX ADMIN — DIPHENHYDRAMINE HYDROCHLORIDE 50 MG: 50 INJECTION, SOLUTION INTRAMUSCULAR; INTRAVENOUS at 16:14

## 2021-01-31 RX ADMIN — METHYLPREDNISOLONE SODIUM SUCCINATE 125 MG: 125 INJECTION, POWDER, FOR SOLUTION INTRAMUSCULAR; INTRAVENOUS at 16:14

## 2021-01-31 ASSESSMENT — ENCOUNTER SYMPTOMS
RHINORRHEA: 0
BACK PAIN: 0
VOMITING: 0
SHORTNESS OF BREATH: 0
SORE THROAT: 0
ABDOMINAL PAIN: 1
COLOR CHANGE: 1
DIARRHEA: 0
NAUSEA: 0

## 2021-01-31 ASSESSMENT — PAIN DESCRIPTION - LOCATION: LOCATION: GROIN

## 2021-01-31 ASSESSMENT — PAIN SCALES - GENERAL: PAINLEVEL_OUTOF10: 6
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
## 2021-02-01 ENCOUNTER — TELEPHONE (OUTPATIENT)
Dept: SURGERY | Age: 31
End: 2021-02-01

## 2021-02-10 ENCOUNTER — OFFICE VISIT (OUTPATIENT)
Dept: SURGERY | Age: 31
End: 2021-02-10

## 2021-02-10 VITALS
WEIGHT: 204.2 LBS | HEART RATE: 69 BPM | OXYGEN SATURATION: 98 % | BODY MASS INDEX: 29.23 KG/M2 | HEIGHT: 70 IN | TEMPERATURE: 97.3 F

## 2021-02-10 DIAGNOSIS — K40.20 NON-RECURRENT BILATERAL INGUINAL HERNIA WITHOUT OBSTRUCTION OR GANGRENE: Primary | Chronic | ICD-10-CM

## 2021-02-10 PROCEDURE — 99024 POSTOP FOLLOW-UP VISIT: CPT | Performed by: SURGERY

## 2021-02-10 NOTE — PROGRESS NOTES
Attempted to contact patient voicemail states the vm hasn't been setup yet unable to leave a VM.    S: Car Santiago comes in today for a post op visit 2 weeks post robotic assisted laparoscopic bilateral inguinal hernia repair. Since his surgery he has done well. No fevers or chills reported. Appetite is normal with no episodes of nausea or vomiting. his incisional pain has about resolved. he has had return of normal bowel and bladder function. Slowly returning to normal activity, but still avoiding heavy lifting. He was seen in the ed a few days after surgery and started on antibiotics for a presumed infection. O: On exam the abdomen is soft and non-tender. his robotic incisions are nicely healed, the bilateral groin shows a solid repair with valsalva does the umbilicus. The cord and testicle are normal. Likewise the left side shows a solid repair when he strains and the cord and testicle are normal.    A: 1) Satisfactory recovery post robotic assisted laparoscopic bilateral inguinal hernia repair and umbilical hernia repair. P: 1) Gradually return to normal activity and continue with usual diet. 2) Follow up with me in 1 month.

## 2021-02-22 DIAGNOSIS — F90.0 ATTENTION DEFICIT HYPERACTIVITY DISORDER (ADHD), PREDOMINANTLY INATTENTIVE TYPE: Primary | ICD-10-CM

## 2021-02-22 NOTE — TELEPHONE ENCOUNTER
Ze Platt called requesting a refill of the below medication which has been pended for you:     Requested Prescriptions     Pending Prescriptions Disp Refills    VYVANSE 30 MG capsule [Pharmacy Med Name: VYVANSE 30 MG CAPSULE] 30 capsule 0     Sig: TAKE 1 CAPSULE BY MOUTH DAILY IN THE MORNING         Allergies   Allergen Reactions    Contrast [Iodides] Hives    Albuterol Nausea And Vomiting             Alcohol, Urine na    Amphetamine Screen, Urine neg    Barbiturate Screen, Urine neg    Benzodiazepine Screen, Urine neg    Buprenorphine Urine neg    Cocaine Metabolite Screen, Urine neg    FENTANYL SCREEN, URINE NA    Gabapentin Screen, Urine NA    MDMA, Urine neg    Methadone Screen, Urine neg    Methamphetamine, Urine neg    Opiate Scrn, Ur neg    Oxycodone Screen, Ur neg    PCP Screen, Urine neg    Propoxyphene Screen, Urine neg    Synthetic Cannabinoids (K2) Screen, Urine na    THC Screen, Urine neg    Tramadol Scrn, Ur na    Tricyclic Antidepressants, Urine neg

## 2021-02-23 RX ORDER — LISDEXAMFETAMINE DIMESYLATE 30 MG/1
CAPSULE ORAL
Qty: 30 CAPSULE | Refills: 0 | Status: SHIPPED | OUTPATIENT
Start: 2021-02-23 | End: 2021-03-29

## 2021-02-23 NOTE — TELEPHONE ENCOUNTER
Dr Oneal Selby will have to refill. Make sure his Peter Aguillon is UTD for her. Also, he needs to reschedule a 3 month follow up with me around 4/8/21.

## 2021-03-27 DIAGNOSIS — F90.0 ATTENTION DEFICIT HYPERACTIVITY DISORDER (ADHD), PREDOMINANTLY INATTENTIVE TYPE: ICD-10-CM

## 2021-03-29 RX ORDER — LISDEXAMFETAMINE DIMESYLATE 30 MG/1
CAPSULE ORAL
Qty: 30 CAPSULE | Refills: 0 | Status: SHIPPED | OUTPATIENT
Start: 2021-03-29 | End: 2021-05-05

## 2021-03-29 NOTE — TELEPHONE ENCOUNTER
Bossman Schirmer called to request a refill on his medication. Last office visit : 2/11/2021   Next office visit : 4/9/2021     Last UDS:   Amphetamine Screen, Urine   Date Value Ref Range Status   11/17/2020 neg  Final     Barbiturate Screen, Urine   Date Value Ref Range Status   11/17/2020 neg  Final     Benzodiazepine Screen, Urine   Date Value Ref Range Status   11/17/2020 neg  Final     Buprenorphine Urine   Date Value Ref Range Status   11/17/2020 neg  Final     Cocaine Metabolite Screen, Urine   Date Value Ref Range Status   11/17/2020 neg  Final     Gabapentin Screen, Urine   Date Value Ref Range Status   11/17/2020 NA  Final     MDMA, Urine   Date Value Ref Range Status   11/17/2020 neg  Final     Methamphetamine, Urine   Date Value Ref Range Status   11/17/2020 neg  Final     Opiate Scrn, Ur   Date Value Ref Range Status   11/17/2020 neg  Final     Oxycodone Screen, Ur   Date Value Ref Range Status   11/17/2020 neg  Final     PCP Screen, Urine   Date Value Ref Range Status   11/17/2020 neg  Final     Propoxyphene Screen, Urine   Date Value Ref Range Status   11/17/2020 neg  Final     THC Screen, Urine   Date Value Ref Range Status   11/17/2020 neg  Final     Tricyclic Antidepressants, Urine   Date Value Ref Range Status   11/17/2020 neg  Final       Last Kelsea Luke: 1/22/21  Medication Contract: 11/20  Last Fill: 2/23/21    Requested Prescriptions     Pending Prescriptions Disp Refills    VYVANSE 30 MG capsule [Pharmacy Med Name: VYVANSE 30 MG CAPSULE] 30 capsule 0     Sig: TAKE 1 CAPSULE BY MOUTH DAILY IN THE MORNING         Please approve or refuse this medication.    Val Dimas MA

## 2021-04-06 ENCOUNTER — OFFICE VISIT (OUTPATIENT)
Dept: FAMILY MEDICINE CLINIC | Age: 31
End: 2021-04-06
Payer: COMMERCIAL

## 2021-04-06 ENCOUNTER — OFFICE VISIT (OUTPATIENT)
Dept: SURGERY | Age: 31
End: 2021-04-06

## 2021-04-06 VITALS
HEART RATE: 96 BPM | TEMPERATURE: 97.9 F | HEIGHT: 71 IN | WEIGHT: 209.25 LBS | BODY MASS INDEX: 29.29 KG/M2 | OXYGEN SATURATION: 98 % | DIASTOLIC BLOOD PRESSURE: 74 MMHG | SYSTOLIC BLOOD PRESSURE: 122 MMHG

## 2021-04-06 VITALS
HEIGHT: 70 IN | WEIGHT: 210 LBS | HEART RATE: 77 BPM | OXYGEN SATURATION: 98 % | TEMPERATURE: 98.2 F | BODY MASS INDEX: 30.06 KG/M2

## 2021-04-06 DIAGNOSIS — K40.20 NON-RECURRENT BILATERAL INGUINAL HERNIA WITHOUT OBSTRUCTION OR GANGRENE: Primary | Chronic | ICD-10-CM

## 2021-04-06 DIAGNOSIS — Z00.00 HEALTHCARE MAINTENANCE: ICD-10-CM

## 2021-04-06 DIAGNOSIS — F90.0 ATTENTION DEFICIT HYPERACTIVITY DISORDER (ADHD), PREDOMINANTLY INATTENTIVE TYPE: Primary | ICD-10-CM

## 2021-04-06 DIAGNOSIS — M62.830 LUMBAR PARASPINAL MUSCLE SPASM: ICD-10-CM

## 2021-04-06 PROCEDURE — 99024 POSTOP FOLLOW-UP VISIT: CPT | Performed by: SURGERY

## 2021-04-06 PROCEDURE — 99213 OFFICE O/P EST LOW 20 MIN: CPT | Performed by: CLINICAL NURSE SPECIALIST

## 2021-04-06 RX ORDER — LIDOCAINE 50 MG/G
1 PATCH TOPICAL DAILY
Qty: 30 PATCH | Refills: 5 | Status: SHIPPED | OUTPATIENT
Start: 2021-04-06 | End: 2021-09-22

## 2021-04-06 ASSESSMENT — ENCOUNTER SYMPTOMS
COUGH: 0
NAUSEA: 0
WHEEZING: 0
EYE PAIN: 0
SHORTNESS OF BREATH: 0
TROUBLE SWALLOWING: 0
EYE DISCHARGE: 0
CONSTIPATION: 0
BACK PAIN: 1
COLOR CHANGE: 0
VOMITING: 0
DIARRHEA: 0
SORE THROAT: 0
CHEST TIGHTNESS: 0
SINUS PRESSURE: 0
ABDOMINAL PAIN: 0

## 2021-04-06 NOTE — PROGRESS NOTES
S: Edward Mancilla comes in today for a post op visit 10 weeks post robotic assisted laparoscopic bilateral inguinal hernia repair. He had a superficial surgical site infection post-operatively which was treated with antibiotics. Overall he is doing well. He is back in the gym working out without issue. He has no pain. O: On exam the abdomen is soft and non-tender. his robotic incisions are nicely healed, the right groin shows a solid repair with valsalva. The cord and testicle are normal. Likewise the left side shows a solid repair when he strains and the cord and testicle are normal.    A: 1) Satisfactory recovery post robotic assisted laparoscopic bilateral inguinal hernia repair. P: 1) Gradually return to normal activity and continue with usual diet. 2) Follow up with me prn.
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
## 2021-05-04 DIAGNOSIS — F90.0 ATTENTION DEFICIT HYPERACTIVITY DISORDER (ADHD), PREDOMINANTLY INATTENTIVE TYPE: ICD-10-CM

## 2021-05-04 NOTE — TELEPHONE ENCOUNTER
Ana Gonzales called requesting a refill of the below medication which has been pended for you:     Requested Prescriptions     Pending Prescriptions Disp Refills    VYVANSE 30 MG capsule [Pharmacy Med Name: VYVANSE 30 MG CAPSULE] 30 capsule 0     Sig: TAKE 1 CAPSULE BY MOUTH DAILY IN THE MORNING       Last Appointment Date: Visit date not found  Next Appointment Date: 7/6/2021    Allergies   Allergen Reactions    Contrast [Iodides] Hives    Albuterol Nausea And Vomiting             11/17/2020 10:45 AM - Mickie Wynne LPN    Component Collected Lab   Alcohol, Urine 11/17/2020 10:44 AM Unknown   na    Amphetamine Screen, Urine 11/17/2020 10:44 AM Unknown   neg    Barbiturate Screen, Urine 11/17/2020 10:44 AM Unknown   neg    Benzodiazepine Screen, Urine 11/17/2020 10:44 AM Unknown   neg    Buprenorphine Urine 11/17/2020 10:44 AM Unknown   neg    Cocaine Metabolite Screen, Urine 11/17/2020 10:44 AM Unknown   neg    FENTANYL SCREEN, URINE 11/17/2020 10:44 AM Unknown   NA    Gabapentin Screen, Urine 11/17/2020 10:44 AM Unknown   NA    MDMA, Urine 11/17/2020 10:44 AM Unknown   neg    Methadone Screen, Urine 11/17/2020 10:44 AM Unknown   neg    Methamphetamine, Urine 11/17/2020 10:44 AM Unknown   neg    Opiate Scrn, Ur 11/17/2020 10:44 AM Unknown   neg    Oxycodone Screen, Ur 11/17/2020 10:44 AM Unknown   neg    PCP Screen, Urine 11/17/2020 10:44 AM Unknown   neg    Propoxyphene Screen, Urine 11/17/2020 10:44 AM Unknown   neg    Synthetic Cannabinoids (K2) Screen, Urine 11/17/2020 10:44 AM Unknown   na    THC Screen, Urine 11/17/2020 10:44 AM Unknown   neg    Tramadol Scrn, Ur 11/17/2020 10:44 AM Unknown   na    Tricyclic Antidepressants, Urine 11/17/2020 10:44 AM Unknown   neg    Lab and Collection    POCT Rapid Drug Screen - 11/17/2020

## 2021-05-05 RX ORDER — LISDEXAMFETAMINE DIMESYLATE 30 MG/1
CAPSULE ORAL
Qty: 30 CAPSULE | Refills: 0 | Status: SHIPPED | OUTPATIENT
Start: 2021-05-05 | End: 2021-06-07 | Stop reason: SDUPTHER

## 2021-06-07 DIAGNOSIS — F90.0 ATTENTION DEFICIT HYPERACTIVITY DISORDER (ADHD), PREDOMINANTLY INATTENTIVE TYPE: ICD-10-CM

## 2021-06-07 NOTE — TELEPHONE ENCOUNTER
Toddpete Byrnes called to request a refill on his medication. Last office visit : 4/6/2021   Next office visit : 7/6/2021     Last UDS:   Amphetamine Screen, Urine   Date Value Ref Range Status   11/17/2020 neg  Final     Barbiturate Screen, Urine   Date Value Ref Range Status   11/17/2020 neg  Final     Benzodiazepine Screen, Urine   Date Value Ref Range Status   11/17/2020 neg  Final     Buprenorphine Urine   Date Value Ref Range Status   11/17/2020 neg  Final     Cocaine Metabolite Screen, Urine   Date Value Ref Range Status   11/17/2020 neg  Final     Gabapentin Screen, Urine   Date Value Ref Range Status   11/17/2020 NA  Final     MDMA, Urine   Date Value Ref Range Status   11/17/2020 neg  Final     Methamphetamine, Urine   Date Value Ref Range Status   11/17/2020 neg  Final     Opiate Scrn, Ur   Date Value Ref Range Status   11/17/2020 neg  Final     Oxycodone Screen, Ur   Date Value Ref Range Status   11/17/2020 neg  Final     PCP Screen, Urine   Date Value Ref Range Status   11/17/2020 neg  Final     Propoxyphene Screen, Urine   Date Value Ref Range Status   11/17/2020 neg  Final     THC Screen, Urine   Date Value Ref Range Status   11/17/2020 neg  Final     Tricyclic Antidepressants, Urine   Date Value Ref Range Status   11/17/2020 neg  Final       Last Doylene Jose: 1/22/21  Medication Contract:    Last Fill: 5/5/21    Requested Prescriptions     Pending Prescriptions Disp Refills    lisdexamfetamine (VYVANSE) 30 MG capsule 30 capsule 0         Please approve or refuse this medication.    Cipriano Slaughter

## 2021-06-07 NOTE — TELEPHONE ENCOUNTER
Refilled medication and e-scribed to pharmacy. Confirm prescription was due. Make sure BEATA and urine drug screen is up to date.

## 2021-06-17 ENCOUNTER — TELEPHONE (OUTPATIENT)
Dept: SURGERY | Age: 31
End: 2021-06-17

## 2021-06-17 NOTE — TELEPHONE ENCOUNTER
Pt called needing a return to work letter. He asked that you please call him and he can come to get it.      Thanks,     Elda

## 2021-07-09 DIAGNOSIS — F90.0 ATTENTION DEFICIT HYPERACTIVITY DISORDER (ADHD), PREDOMINANTLY INATTENTIVE TYPE: ICD-10-CM

## 2021-07-09 RX ORDER — LISDEXAMFETAMINE DIMESYLATE 30 MG/1
CAPSULE ORAL
Qty: 30 CAPSULE | Refills: 0 | Status: SHIPPED | OUTPATIENT
Start: 2021-07-09 | End: 2021-08-17 | Stop reason: SDUPTHER

## 2021-07-09 NOTE — TELEPHONE ENCOUNTER
Mable Loomis called to request a refill on his medication. Last office visit : 4/6/2021   Next office visit : Visit date not found     Last UDS:   Amphetamine Screen, Urine   Date Value Ref Range Status   11/17/2020 neg  Final     Barbiturate Screen, Urine   Date Value Ref Range Status   11/17/2020 neg  Final     Benzodiazepine Screen, Urine   Date Value Ref Range Status   11/17/2020 neg  Final     Buprenorphine Urine   Date Value Ref Range Status   11/17/2020 neg  Final     Cocaine Metabolite Screen, Urine   Date Value Ref Range Status   11/17/2020 neg  Final     Gabapentin Screen, Urine   Date Value Ref Range Status   11/17/2020 NA  Final     MDMA, Urine   Date Value Ref Range Status   11/17/2020 neg  Final     Methamphetamine, Urine   Date Value Ref Range Status   11/17/2020 neg  Final     Opiate Scrn, Ur   Date Value Ref Range Status   11/17/2020 neg  Final     Oxycodone Screen, Ur   Date Value Ref Range Status   11/17/2020 neg  Final     PCP Screen, Urine   Date Value Ref Range Status   11/17/2020 neg  Final     Propoxyphene Screen, Urine   Date Value Ref Range Status   11/17/2020 neg  Final     THC Screen, Urine   Date Value Ref Range Status   11/17/2020 neg  Final     Tricyclic Antidepressants, Urine   Date Value Ref Range Status   11/17/2020 neg  Final       Last Rosmery Embs: 7/9/21  Medication Contract: na   Last Fill: 6/7/21    Requested Prescriptions     Pending Prescriptions Disp Refills    VYVANSE 30 MG capsule [Pharmacy Med Name: VYVANSE 30 MG CAPSULE] 30 capsule 0     Sig: TAKE 1 CAPSULE BY MOUTH DAILY IN THE MORNING         Please approve or refuse this medication.    Rubi Long

## 2021-08-17 ENCOUNTER — E-VISIT (OUTPATIENT)
Dept: FAMILY MEDICINE CLINIC | Age: 31
End: 2021-08-17
Payer: COMMERCIAL

## 2021-08-17 DIAGNOSIS — F90.0 ATTENTION DEFICIT HYPERACTIVITY DISORDER (ADHD), PREDOMINANTLY INATTENTIVE TYPE: Primary | ICD-10-CM

## 2021-08-17 DIAGNOSIS — F90.0 ATTENTION DEFICIT HYPERACTIVITY DISORDER (ADHD), PREDOMINANTLY INATTENTIVE TYPE: ICD-10-CM

## 2021-08-17 PROCEDURE — 99421 OL DIG E/M SVC 5-10 MIN: CPT | Performed by: CLINICAL NURSE SPECIALIST

## 2021-08-17 RX ORDER — LISDEXAMFETAMINE DIMESYLATE 30 MG/1
CAPSULE ORAL
Qty: 30 CAPSULE | Refills: 0 | OUTPATIENT
Start: 2021-08-17 | End: 2021-09-16

## 2021-08-17 NOTE — PROGRESS NOTES
Patient with controlled ADHD-I on vyvanse. No side effects. VSS. Leatha Felix and EVELINA UTD. Refilled vyvanse and monitor closely every 3 months. Total time 5 minutes.

## 2021-09-22 ENCOUNTER — TELEMEDICINE (OUTPATIENT)
Dept: FAMILY MEDICINE CLINIC | Age: 31
End: 2021-09-22
Payer: COMMERCIAL

## 2021-09-22 ENCOUNTER — TELEPHONE (OUTPATIENT)
Dept: FAMILY MEDICINE CLINIC | Age: 31
End: 2021-09-22

## 2021-09-22 DIAGNOSIS — F90.0 ATTENTION DEFICIT HYPERACTIVITY DISORDER (ADHD), PREDOMINANTLY INATTENTIVE TYPE: Primary | ICD-10-CM

## 2021-09-22 PROCEDURE — 99213 OFFICE O/P EST LOW 20 MIN: CPT | Performed by: CLINICAL NURSE SPECIALIST

## 2021-09-22 ASSESSMENT — ENCOUNTER SYMPTOMS
CONSTIPATION: 0
WHEEZING: 0
BACK PAIN: 0
EYE PAIN: 0
VOMITING: 0
ABDOMINAL PAIN: 0
CHEST TIGHTNESS: 0
DIARRHEA: 0
COLOR CHANGE: 0
SORE THROAT: 0
SHORTNESS OF BREATH: 0
SINUS PRESSURE: 0
NAUSEA: 0
TROUBLE SWALLOWING: 0
EYE DISCHARGE: 0
COUGH: 0

## 2021-09-22 NOTE — TELEPHONE ENCOUNTER
Let him know Dr Noble Sender recommends changing to adderall XR 30mg daily and stopping vyvanse. If ok with that, please pend for her to refill for him.
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
## 2021-09-23 DIAGNOSIS — F90.0 ATTENTION DEFICIT HYPERACTIVITY DISORDER (ADHD), PREDOMINANTLY INATTENTIVE TYPE: Primary | ICD-10-CM

## 2021-09-23 RX ORDER — DEXTROAMPHETAMINE SACCHARATE, AMPHETAMINE ASPARTATE MONOHYDRATE, DEXTROAMPHETAMINE SULFATE AND AMPHETAMINE SULFATE 7.5; 7.5; 7.5; 7.5 MG/1; MG/1; MG/1; MG/1
30 CAPSULE, EXTENDED RELEASE ORAL DAILY
Qty: 30 CAPSULE | Refills: 0 | Status: SHIPPED | OUTPATIENT
Start: 2021-09-23 | End: 2021-11-24 | Stop reason: SINTOL

## 2021-09-23 NOTE — TELEPHONE ENCOUNTER
Jacey Late called to request a refill on his medication. Last office visit : 9/22/2021   Next office visit : 12/22/2021     Last UDS:   Amphetamine Screen, Urine   Date Value Ref Range Status   11/17/2020 neg  Final     Barbiturate Screen, Urine   Date Value Ref Range Status   11/17/2020 neg  Final     Benzodiazepine Screen, Urine   Date Value Ref Range Status   11/17/2020 neg  Final     Buprenorphine Urine   Date Value Ref Range Status   11/17/2020 neg  Final     Cocaine Metabolite Screen, Urine   Date Value Ref Range Status   11/17/2020 neg  Final     Gabapentin Screen, Urine   Date Value Ref Range Status   11/17/2020 NA  Final     MDMA, Urine   Date Value Ref Range Status   11/17/2020 neg  Final     Methamphetamine, Urine   Date Value Ref Range Status   11/17/2020 neg  Final     Opiate Scrn, Ur   Date Value Ref Range Status   11/17/2020 neg  Final     Oxycodone Screen, Ur   Date Value Ref Range Status   11/17/2020 neg  Final     PCP Screen, Urine   Date Value Ref Range Status   11/17/2020 neg  Final     Propoxyphene Screen, Urine   Date Value Ref Range Status   11/17/2020 neg  Final     THC Screen, Urine   Date Value Ref Range Status   11/17/2020 neg  Final     Tricyclic Antidepressants, Urine   Date Value Ref Range Status   11/17/2020 neg  Final       Last Jamshid Deal: 7/7/2021  Medication Contract:    Last Fill: 8/17/2021    Requested Prescriptions     Pending Prescriptions Disp Refills    amphetamine-dextroamphetamine (ADDERALL XR) 30 MG extended release capsule 30 capsule 0     Sig: Take 1 capsule by mouth daily for 30 days. Please approve or refuse this medication.    Calos Avalos MA

## 2021-09-23 NOTE — TELEPHONE ENCOUNTER
Spoke with patient and he said he did Adderall in the past, but still had similar issues. He states he is afraid he will still have the same issues of running 3-4 hours short. He just wants to double check before starting a new medication.

## 2021-09-23 NOTE — TELEPHONE ENCOUNTER
adderall xr is extended release which is why Dr Stephen Talley recommends this. Or he can stay on vyvanse as that is what she recommended to try.  If you try the adderall xr for one month and not effective, we can regroup

## 2021-11-24 ENCOUNTER — PATIENT MESSAGE (OUTPATIENT)
Dept: FAMILY MEDICINE CLINIC | Age: 31
End: 2021-11-24

## 2021-11-24 DIAGNOSIS — F90.0 ATTENTION DEFICIT HYPERACTIVITY DISORDER (ADHD), PREDOMINANTLY INATTENTIVE TYPE: ICD-10-CM

## 2021-11-24 NOTE — TELEPHONE ENCOUNTER
From: Jessi Balderrama  To: Constance Rosas  Sent: 11/24/2021 11:09 AM CST  Subject: Prescription Question    [facility],    I would like to apologize for any appointments I might have missed since I have been on Covid Relief orders with the [facility] for the past few months. I was hoping you would consider putting me back on Vivance as soon as possible. The Adderall had far too strong of an effect and I was not able to slow down and I also experienced jaw movements, similar to a tic i would assume. I used the Adderall for approximately two weeks and still have the remaining capsules that I can turn in for destruction if legally required. I am currently stationed in [place] [facility], and if you decide to allow the switch back to Vivance I can provide you with a local pharmacy's information.      Thanks,    Yahir Araujo

## 2021-11-24 NOTE — TELEPHONE ENCOUNTER
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 Please pend his vyvanse for her, you may see what pharmacy he is using first and then let him know it has been pended for her to refill.   Not sure when she will get to it given the holiday

## 2022-01-13 ENCOUNTER — OFFICE VISIT (OUTPATIENT)
Dept: FAMILY MEDICINE CLINIC | Age: 32
End: 2022-01-13
Payer: OTHER GOVERNMENT

## 2022-01-13 VITALS
TEMPERATURE: 97.6 F | SYSTOLIC BLOOD PRESSURE: 118 MMHG | HEART RATE: 81 BPM | WEIGHT: 186.2 LBS | BODY MASS INDEX: 26.66 KG/M2 | DIASTOLIC BLOOD PRESSURE: 82 MMHG | OXYGEN SATURATION: 98 % | RESPIRATION RATE: 16 BRPM | HEIGHT: 70 IN

## 2022-01-13 DIAGNOSIS — F90.0 ATTENTION DEFICIT HYPERACTIVITY DISORDER (ADHD), PREDOMINANTLY INATTENTIVE TYPE: Primary | ICD-10-CM

## 2022-01-13 DIAGNOSIS — F90.0 ATTENTION DEFICIT HYPERACTIVITY DISORDER (ADHD), PREDOMINANTLY INATTENTIVE TYPE: ICD-10-CM

## 2022-01-13 DIAGNOSIS — L01.00 IMPETIGO: ICD-10-CM

## 2022-01-13 DIAGNOSIS — B02.9 HERPES ZOSTER WITHOUT COMPLICATION: ICD-10-CM

## 2022-01-13 PROCEDURE — 80305 DRUG TEST PRSMV DIR OPT OBS: CPT | Performed by: CLINICAL NURSE SPECIALIST

## 2022-01-13 PROCEDURE — 99214 OFFICE O/P EST MOD 30 MIN: CPT | Performed by: CLINICAL NURSE SPECIALIST

## 2022-01-13 RX ORDER — DEXAMETHASONE 1 MG
TABLET ORAL
COMMUNITY
Start: 2022-01-12 | End: 2022-05-20

## 2022-01-13 RX ORDER — VALACYCLOVIR HYDROCHLORIDE 1 G/1
TABLET, FILM COATED ORAL
COMMUNITY
Start: 2022-01-12 | End: 2022-05-20

## 2022-01-13 ASSESSMENT — ENCOUNTER SYMPTOMS
CHEST TIGHTNESS: 0
SORE THROAT: 0
COLOR CHANGE: 0
TROUBLE SWALLOWING: 0
VOMITING: 0
NAUSEA: 0
EYE PAIN: 0
SHORTNESS OF BREATH: 0
ABDOMINAL PAIN: 0
EYE DISCHARGE: 0
BACK PAIN: 0
SINUS PRESSURE: 0
CONSTIPATION: 0
COUGH: 0
WHEEZING: 0
DIARRHEA: 0

## 2022-01-13 ASSESSMENT — PATIENT HEALTH QUESTIONNAIRE - PHQ9
5. POOR APPETITE OR OVEREATING: 0
SUM OF ALL RESPONSES TO PHQ QUESTIONS 1-9: 1
3. TROUBLE FALLING OR STAYING ASLEEP: 0
6. FEELING BAD ABOUT YOURSELF - OR THAT YOU ARE A FAILURE OR HAVE LET YOURSELF OR YOUR FAMILY DOWN: 0
9. THOUGHTS THAT YOU WOULD BE BETTER OFF DEAD, OR OF HURTING YOURSELF: 0
SUM OF ALL RESPONSES TO PHQ9 QUESTIONS 1 & 2: 0
2. FEELING DOWN, DEPRESSED OR HOPELESS: 0
1. LITTLE INTEREST OR PLEASURE IN DOING THINGS: 0
SUM OF ALL RESPONSES TO PHQ QUESTIONS 1-9: 1
8. MOVING OR SPEAKING SO SLOWLY THAT OTHER PEOPLE COULD HAVE NOTICED. OR THE OPPOSITE, BEING SO FIGETY OR RESTLESS THAT YOU HAVE BEEN MOVING AROUND A LOT MORE THAN USUAL: 0
SUM OF ALL RESPONSES TO PHQ QUESTIONS 1-9: 1
10. IF YOU CHECKED OFF ANY PROBLEMS, HOW DIFFICULT HAVE THESE PROBLEMS MADE IT FOR YOU TO DO YOUR WORK, TAKE CARE OF THINGS AT HOME, OR GET ALONG WITH OTHER PEOPLE: 0
4. FEELING TIRED OR HAVING LITTLE ENERGY: 0
7. TROUBLE CONCENTRATING ON THINGS, SUCH AS READING THE NEWSPAPER OR WATCHING TELEVISION: 1
SUM OF ALL RESPONSES TO PHQ QUESTIONS 1-9: 1

## 2022-01-13 NOTE — TELEPHONE ENCOUNTER
Elisabeth Kaur called to request a refill on his medication. Last office visit : 1/13/2022   Next office visit : Visit date not found     Last UDS:   Amphetamine Screen, Urine   Date Value Ref Range Status   01/13/2022 pos  Final     Comment:     vyvanse     Barbiturate Screen, Urine   Date Value Ref Range Status   01/13/2022 neg  Final     Benzodiazepine Screen, Urine   Date Value Ref Range Status   01/13/2022 neg  Final     Buprenorphine Urine   Date Value Ref Range Status   01/13/2022 neg  Final     Cocaine Metabolite Screen, Urine   Date Value Ref Range Status   01/13/2022 neg  Final     Gabapentin Screen, Urine   Date Value Ref Range Status   01/13/2022 n/a  Final     MDMA, Urine   Date Value Ref Range Status   01/13/2022 neg  Final     Methamphetamine, Urine   Date Value Ref Range Status   01/13/2022 n/a  Final     Opiate Scrn, Ur   Date Value Ref Range Status   01/13/2022 neg  Final     Oxycodone Screen, Ur   Date Value Ref Range Status   01/13/2022 neg  Final     PCP Screen, Urine   Date Value Ref Range Status   01/13/2022 neg  Final     Propoxyphene Screen, Urine   Date Value Ref Range Status   01/13/2022 n/a  Final     THC Screen, Urine   Date Value Ref Range Status   01/13/2022 neg  Final     Tricyclic Antidepressants, Urine   Date Value Ref Range Status   01/13/2022 n/a  Final       Last Asiya Aminata: 1/13/2022  Medication Contract: 1/13/2022   Last Fill: 11/24/2021    Requested Prescriptions     Pending Prescriptions Disp Refills    lisdexamfetamine (VYVANSE) 30 MG capsule 30 capsule 0     Sig: TAKE 1 CAPSULE BY MOUTH DAILY IN THE MORNING               Please approve or refuse this medication.    Cb Arenas MA
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
## 2022-02-16 DIAGNOSIS — F90.0 ATTENTION DEFICIT HYPERACTIVITY DISORDER (ADHD), PREDOMINANTLY INATTENTIVE TYPE: ICD-10-CM

## 2022-02-16 NOTE — TELEPHONE ENCOUNTER
Lloyd Anderson called to request a refill on his medication. Last office visit : 1/13/2022   Next office visit : 4/13/2022     Last UDS:   Amphetamine Screen, Urine   Date Value Ref Range Status   01/13/2022 pos  Final     Comment:     vyvanse     Barbiturate Screen, Urine   Date Value Ref Range Status   01/13/2022 neg  Final     Benzodiazepine Screen, Urine   Date Value Ref Range Status   01/13/2022 neg  Final     Buprenorphine Urine   Date Value Ref Range Status   01/13/2022 neg  Final     Cocaine Metabolite Screen, Urine   Date Value Ref Range Status   01/13/2022 neg  Final     Gabapentin Screen, Urine   Date Value Ref Range Status   01/13/2022 n/a  Final     MDMA, Urine   Date Value Ref Range Status   01/13/2022 neg  Final     Methamphetamine, Urine   Date Value Ref Range Status   01/13/2022 n/a  Final     Opiate Scrn, Ur   Date Value Ref Range Status   01/13/2022 neg  Final     Oxycodone Screen, Ur   Date Value Ref Range Status   01/13/2022 neg  Final     PCP Screen, Urine   Date Value Ref Range Status   01/13/2022 neg  Final     Propoxyphene Screen, Urine   Date Value Ref Range Status   01/13/2022 n/a  Final     THC Screen, Urine   Date Value Ref Range Status   01/13/2022 neg  Final     Tricyclic Antidepressants, Urine   Date Value Ref Range Status   01/13/2022 n/a  Final       Last Marisol Bee: 1/13/2022  Medication Contract: 1/13/2022   Last Fill: 1/13/2022    Requested Prescriptions     Pending Prescriptions Disp Refills    lisdexamfetamine (VYVANSE) 30 MG capsule 30 capsule 0     Sig: TAKE 1 CAPSULE BY MOUTH DAILY IN THE MORNING               Please approve or refuse this medication.    Jennifer Soler MA

## 2022-02-16 NOTE — TELEPHONE ENCOUNTER
The current medical regimen is effective. Continue present plan and medications. UDS and controlled substance contract up to date. No unusual filling on current BEATA report. Tx continues to be medically necessary.     Jeff Aponte MD

## 2022-02-16 NOTE — TELEPHONE ENCOUNTER
Patient is needing a refill on prescription Vyvance 30 mg x1 daily. Please send script to [facility].

## 2022-03-22 DIAGNOSIS — F90.0 ATTENTION DEFICIT HYPERACTIVITY DISORDER (ADHD), PREDOMINANTLY INATTENTIVE TYPE: ICD-10-CM

## 2022-03-22 DIAGNOSIS — Z00.00 HEALTHCARE MAINTENANCE: ICD-10-CM

## 2022-03-22 LAB
ALBUMIN SERPL-MCNC: 4.4 G/DL (ref 3.5–5.2)
ALP BLD-CCNC: 83 U/L (ref 40–130)
ALT SERPL-CCNC: 18 U/L (ref 5–41)
ANION GAP SERPL CALCULATED.3IONS-SCNC: 8 MMOL/L (ref 7–19)
AST SERPL-CCNC: 18 U/L (ref 5–40)
BASOPHILS ABSOLUTE: 0.1 K/UL (ref 0–0.2)
BASOPHILS RELATIVE PERCENT: 1.4 % (ref 0–1)
BILIRUB SERPL-MCNC: 0.3 MG/DL (ref 0.2–1.2)
BUN BLDV-MCNC: 9 MG/DL (ref 6–20)
CALCIUM SERPL-MCNC: 9.1 MG/DL (ref 8.6–10)
CHLORIDE BLD-SCNC: 104 MMOL/L (ref 98–111)
CHOLESTEROL, TOTAL: 199 MG/DL (ref 160–199)
CO2: 30 MMOL/L (ref 22–29)
CREAT SERPL-MCNC: 0.9 MG/DL (ref 0.5–1.2)
EOSINOPHILS ABSOLUTE: 0.2 K/UL (ref 0–0.6)
EOSINOPHILS RELATIVE PERCENT: 4.1 % (ref 0–5)
GFR AFRICAN AMERICAN: >59
GFR NON-AFRICAN AMERICAN: >60
GLUCOSE BLD-MCNC: 99 MG/DL (ref 74–109)
HCT VFR BLD CALC: 45.4 % (ref 42–52)
HDLC SERPL-MCNC: 55 MG/DL (ref 55–121)
HEMOGLOBIN: 14.9 G/DL (ref 14–18)
IMMATURE GRANULOCYTES #: 0 K/UL
LDL CHOLESTEROL CALCULATED: 128 MG/DL
LYMPHOCYTES ABSOLUTE: 1.6 K/UL (ref 1.1–4.5)
LYMPHOCYTES RELATIVE PERCENT: 31.7 % (ref 20–40)
MCH RBC QN AUTO: 33 PG (ref 27–31)
MCHC RBC AUTO-ENTMCNC: 32.8 G/DL (ref 33–37)
MCV RBC AUTO: 100.7 FL (ref 80–94)
MONOCYTES ABSOLUTE: 0.8 K/UL (ref 0–0.9)
MONOCYTES RELATIVE PERCENT: 15.6 % (ref 0–10)
NEUTROPHILS ABSOLUTE: 2.4 K/UL (ref 1.5–7.5)
NEUTROPHILS RELATIVE PERCENT: 46.8 % (ref 50–65)
PDW BLD-RTO: 12.3 % (ref 11.5–14.5)
PLATELET # BLD: 204 K/UL (ref 130–400)
PMV BLD AUTO: 10.6 FL (ref 9.4–12.4)
POTASSIUM SERPL-SCNC: 4.3 MMOL/L (ref 3.5–5)
RBC # BLD: 4.51 M/UL (ref 4.7–6.1)
SODIUM BLD-SCNC: 142 MMOL/L (ref 136–145)
TOTAL PROTEIN: 7 G/DL (ref 6.6–8.7)
TRIGL SERPL-MCNC: 79 MG/DL (ref 0–149)
WBC # BLD: 5.1 K/UL (ref 4.8–10.8)

## 2022-03-22 NOTE — TELEPHONE ENCOUNTER
Sandra Casonpa called to request a refill on his medication. Last office visit : 1/13/2022   Next office visit : 4/13/2022     Last UDS:   Amphetamine Screen, Urine   Date Value Ref Range Status   01/13/2022 pos  Final     Comment:     vyvanse     Barbiturate Screen, Urine   Date Value Ref Range Status   01/13/2022 neg  Final     Benzodiazepine Screen, Urine   Date Value Ref Range Status   01/13/2022 neg  Final     Buprenorphine Urine   Date Value Ref Range Status   01/13/2022 neg  Final     Cocaine Metabolite Screen, Urine   Date Value Ref Range Status   01/13/2022 neg  Final     Gabapentin Screen, Urine   Date Value Ref Range Status   01/13/2022 n/a  Final     MDMA, Urine   Date Value Ref Range Status   01/13/2022 neg  Final     Methamphetamine, Urine   Date Value Ref Range Status   01/13/2022 n/a  Final     Opiate Scrn, Ur   Date Value Ref Range Status   01/13/2022 neg  Final     Oxycodone Screen, Ur   Date Value Ref Range Status   01/13/2022 neg  Final     PCP Screen, Urine   Date Value Ref Range Status   01/13/2022 neg  Final     Propoxyphene Screen, Urine   Date Value Ref Range Status   01/13/2022 n/a  Final     THC Screen, Urine   Date Value Ref Range Status   01/13/2022 neg  Final     Tricyclic Antidepressants, Urine   Date Value Ref Range Status   01/13/2022 n/a  Final       Last Gracia Sotelo: 1/13/2022  Medication Contract: 1/13/2022   Last Fill: 2/16/2022    Requested Prescriptions     Pending Prescriptions Disp Refills    lisdexamfetamine (VYVANSE) 30 MG capsule 30 capsule 0     Sig: TAKE 1 CAPSULE BY MOUTH DAILY IN THE MORNING             Please approve or refuse this medication.    Pavan Long MA

## 2022-05-06 DIAGNOSIS — F90.0 ATTENTION DEFICIT HYPERACTIVITY DISORDER (ADHD), PREDOMINANTLY INATTENTIVE TYPE: ICD-10-CM

## 2022-05-20 ENCOUNTER — OFFICE VISIT (OUTPATIENT)
Dept: FAMILY MEDICINE CLINIC | Age: 32
End: 2022-05-20
Payer: OTHER GOVERNMENT

## 2022-05-20 VITALS
DIASTOLIC BLOOD PRESSURE: 78 MMHG | BODY MASS INDEX: 25.9 KG/M2 | WEIGHT: 185 LBS | SYSTOLIC BLOOD PRESSURE: 124 MMHG | HEART RATE: 82 BPM | HEIGHT: 71 IN | OXYGEN SATURATION: 98 %

## 2022-05-20 DIAGNOSIS — F90.0 ATTENTION DEFICIT HYPERACTIVITY DISORDER (ADHD), PREDOMINANTLY INATTENTIVE TYPE: ICD-10-CM

## 2022-05-20 DIAGNOSIS — F90.0 ATTENTION DEFICIT HYPERACTIVITY DISORDER (ADHD), PREDOMINANTLY INATTENTIVE TYPE: Primary | ICD-10-CM

## 2022-05-20 PROCEDURE — 99213 OFFICE O/P EST LOW 20 MIN: CPT | Performed by: NURSE PRACTITIONER

## 2022-05-20 ASSESSMENT — ENCOUNTER SYMPTOMS
CHEST TIGHTNESS: 0
SORE THROAT: 0
COUGH: 0
NAUSEA: 0
DIARRHEA: 0
WHEEZING: 0
ABDOMINAL PAIN: 0
SHORTNESS OF BREATH: 0

## 2022-05-20 NOTE — TELEPHONE ENCOUNTER
Marily Alvarado called to request a refill on his medication. Last office visit : 5/20/2022   Next office visit :     Last UDS:   Amphetamine Screen, Urine   Date Value Ref Range Status   01/13/2022 pos  Final     Comment:     vyvanse     Barbiturate Screen, Urine   Date Value Ref Range Status   01/13/2022 neg  Final     Benzodiazepine Screen, Urine   Date Value Ref Range Status   01/13/2022 neg  Final     Buprenorphine Urine   Date Value Ref Range Status   01/13/2022 neg  Final     Cocaine Metabolite Screen, Urine   Date Value Ref Range Status   01/13/2022 neg  Final     Gabapentin Screen, Urine   Date Value Ref Range Status   01/13/2022 n/a  Final     MDMA, Urine   Date Value Ref Range Status   01/13/2022 neg  Final     Methamphetamine, Urine   Date Value Ref Range Status   01/13/2022 n/a  Final     Opiate Scrn, Ur   Date Value Ref Range Status   01/13/2022 neg  Final     Oxycodone Screen, Ur   Date Value Ref Range Status   01/13/2022 neg  Final     PCP Screen, Urine   Date Value Ref Range Status   01/13/2022 neg  Final     Propoxyphene Screen, Urine   Date Value Ref Range Status   01/13/2022 n/a  Final     THC Screen, Urine   Date Value Ref Range Status   01/13/2022 neg  Final     Tricyclic Antidepressants, Urine   Date Value Ref Range Status   01/13/2022 n/a  Final       Requested Prescriptions      No prescriptions requested or ordered in this encounter         Please approve or refuse this medication.    Erma Burgess MA
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
## 2022-06-20 DIAGNOSIS — F90.0 ATTENTION DEFICIT HYPERACTIVITY DISORDER (ADHD), PREDOMINANTLY INATTENTIVE TYPE: ICD-10-CM

## 2022-06-20 NOTE — TELEPHONE ENCOUNTER
Alyce Jim called to request a refill on his medication. Last office visit : 5/20/2022   Next office visit : 8/22/2022     Last UDS:   Amphetamine Screen, Urine   Date Value Ref Range Status   01/13/2022 pos  Final     Comment:     vyvanse     Barbiturate Screen, Urine   Date Value Ref Range Status   01/13/2022 neg  Final     Benzodiazepine Screen, Urine   Date Value Ref Range Status   01/13/2022 neg  Final     Buprenorphine Urine   Date Value Ref Range Status   01/13/2022 neg  Final     Cocaine Metabolite Screen, Urine   Date Value Ref Range Status   01/13/2022 neg  Final     Gabapentin Screen, Urine   Date Value Ref Range Status   01/13/2022 n/a  Final     MDMA, Urine   Date Value Ref Range Status   01/13/2022 neg  Final     Methamphetamine, Urine   Date Value Ref Range Status   01/13/2022 n/a  Final     Opiate Scrn, Ur   Date Value Ref Range Status   01/13/2022 neg  Final     Oxycodone Screen, Ur   Date Value Ref Range Status   01/13/2022 neg  Final     PCP Screen, Urine   Date Value Ref Range Status   01/13/2022 neg  Final     Propoxyphene Screen, Urine   Date Value Ref Range Status   01/13/2022 n/a  Final     THC Screen, Urine   Date Value Ref Range Status   01/13/2022 neg  Final     Tricyclic Antidepressants, Urine   Date Value Ref Range Status   01/13/2022 n/a  Final       Last Timo Wall: 5/20/22      Requested Prescriptions     Pending Prescriptions Disp Refills    lisdexamfetamine (VYVANSE) 30 MG capsule [Pharmacy Med Name: VYVANSE 30 MG CAPSULE] 30 capsule 0     Sig: TAKE 1 CAPSULE BY MOUTH DAILY IN THE MORNING         Please approve or refuse this medication.    Erma Burgess MA

## 2022-07-28 DIAGNOSIS — F90.0 ATTENTION DEFICIT HYPERACTIVITY DISORDER (ADHD), PREDOMINANTLY INATTENTIVE TYPE: ICD-10-CM

## 2022-09-21 DIAGNOSIS — F90.0 ATTENTION DEFICIT HYPERACTIVITY DISORDER (ADHD), PREDOMINANTLY INATTENTIVE TYPE: ICD-10-CM

## 2022-09-22 NOTE — TELEPHONE ENCOUNTER
Dunia Ashton called to request a refill on his medication. Last office visit : 5/20/2022   Next office visit : Visit date not found     Last UDS:   Amphetamine Screen, Urine   Date Value Ref Range Status   01/13/2022 pos  Final     Comment:     vyvanse     Barbiturate Screen, Urine   Date Value Ref Range Status   01/13/2022 neg  Final     Benzodiazepine Screen, Urine   Date Value Ref Range Status   01/13/2022 neg  Final     Buprenorphine Urine   Date Value Ref Range Status   01/13/2022 neg  Final     Cocaine Metabolite Screen, Urine   Date Value Ref Range Status   01/13/2022 neg  Final     Gabapentin Screen, Urine   Date Value Ref Range Status   01/13/2022 n/a  Final     MDMA, Urine   Date Value Ref Range Status   01/13/2022 neg  Final     Methamphetamine, Urine   Date Value Ref Range Status   01/13/2022 n/a  Final     Opiate Scrn, Ur   Date Value Ref Range Status   01/13/2022 neg  Final     Oxycodone Screen, Ur   Date Value Ref Range Status   01/13/2022 neg  Final     PCP Screen, Urine   Date Value Ref Range Status   01/13/2022 neg  Final     Propoxyphene Screen, Urine   Date Value Ref Range Status   01/13/2022 n/a  Final     THC Screen, Urine   Date Value Ref Range Status   01/13/2022 neg  Final     Tricyclic Antidepressants, Urine   Date Value Ref Range Status   01/13/2022 n/a  Final       Last Tarry Martyr: 9/22/22  Medication Contract: 1/13/22   Last Fill: 7/28/22    Requested Prescriptions     Pending Prescriptions Disp Refills    lisdexamfetamine (VYVANSE) 30 MG capsule [Pharmacy Med Name: VYVANSE 30 MG CAPSULE] 30 capsule 0     Sig: TAKE 1 CAPSULE BY MOUTH DAILY IN THE MORNING         Please approve or refuse this medication.    Angy Childs MA

## 2023-03-19 ENCOUNTER — OFFICE VISIT (OUTPATIENT)
Age: 33
End: 2023-03-19
Payer: OTHER GOVERNMENT

## 2023-03-19 VITALS
RESPIRATION RATE: 18 BRPM | DIASTOLIC BLOOD PRESSURE: 80 MMHG | WEIGHT: 197 LBS | TEMPERATURE: 98.1 F | OXYGEN SATURATION: 96 % | SYSTOLIC BLOOD PRESSURE: 132 MMHG | HEART RATE: 83 BPM | BODY MASS INDEX: 27.48 KG/M2

## 2023-03-19 DIAGNOSIS — J06.9 UPPER RESPIRATORY TRACT INFECTION, UNSPECIFIED TYPE: Primary | ICD-10-CM

## 2023-03-19 PROCEDURE — 99213 OFFICE O/P EST LOW 20 MIN: CPT | Performed by: NURSE PRACTITIONER

## 2023-03-19 PROCEDURE — 96372 THER/PROPH/DIAG INJ SC/IM: CPT | Performed by: NURSE PRACTITIONER

## 2023-03-19 RX ORDER — DEXAMETHASONE SODIUM PHOSPHATE 10 MG/ML
10 INJECTION INTRAMUSCULAR; INTRAVENOUS ONCE
Status: COMPLETED | OUTPATIENT
Start: 2023-03-19 | End: 2023-03-19

## 2023-03-19 RX ORDER — AMOXICILLIN AND CLAVULANATE POTASSIUM 875; 125 MG/1; MG/1
1 TABLET, FILM COATED ORAL 2 TIMES DAILY
Qty: 20 TABLET | Refills: 0 | Status: SHIPPED | OUTPATIENT
Start: 2023-03-19 | End: 2023-03-29

## 2023-03-19 RX ADMIN — DEXAMETHASONE SODIUM PHOSPHATE 10 MG: 10 INJECTION INTRAMUSCULAR; INTRAVENOUS at 08:24

## 2023-03-19 ASSESSMENT — ENCOUNTER SYMPTOMS
RHINORRHEA: 1
COUGH: 1
SORE THROAT: 1
ABDOMINAL PAIN: 0
ABDOMINAL DISTENTION: 0
SINUS PRESSURE: 0
EYE DISCHARGE: 0
CHEST TIGHTNESS: 0
STRIDOR: 0
WHEEZING: 0
COLOR CHANGE: 0
SHORTNESS OF BREATH: 1
TROUBLE SWALLOWING: 0
EYE PAIN: 0

## 2023-03-19 NOTE — PATIENT INSTRUCTIONS
Encourage fluids, Tylenol/Ibuprofen, OTC decongestants   Antibiotic sent to pharmacy, Dex IM given.   If symptoms worsen or fail to improve follow-up with office or PCP  If SOB, chest pain, or high persistent fevers occur, go to ER    Patient verbalized understanding and agrees to plan
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
## 2023-05-23 ENCOUNTER — OFFICE VISIT (OUTPATIENT)
Age: 33
End: 2023-05-23
Payer: OTHER GOVERNMENT

## 2023-05-23 VITALS
BODY MASS INDEX: 27.2 KG/M2 | WEIGHT: 195 LBS | HEART RATE: 65 BPM | SYSTOLIC BLOOD PRESSURE: 128 MMHG | TEMPERATURE: 98.8 F | OXYGEN SATURATION: 97 % | DIASTOLIC BLOOD PRESSURE: 70 MMHG | RESPIRATION RATE: 20 BRPM

## 2023-05-23 DIAGNOSIS — J02.0 STREP PHARYNGITIS: Primary | ICD-10-CM

## 2023-05-23 LAB — S PYO AG THROAT QL: POSITIVE

## 2023-05-23 PROCEDURE — 96372 THER/PROPH/DIAG INJ SC/IM: CPT

## 2023-05-23 PROCEDURE — 99213 OFFICE O/P EST LOW 20 MIN: CPT

## 2023-05-23 PROCEDURE — 87880 STREP A ASSAY W/OPTIC: CPT

## 2023-05-23 RX ORDER — AMOXICILLIN AND CLAVULANATE POTASSIUM 875; 125 MG/1; MG/1
1 TABLET, FILM COATED ORAL 2 TIMES DAILY
Qty: 20 TABLET | Refills: 0 | Status: SHIPPED | OUTPATIENT
Start: 2023-05-23 | End: 2023-06-02

## 2023-05-23 RX ORDER — DEXAMETHASONE SODIUM PHOSPHATE 10 MG/ML
5 INJECTION INTRAMUSCULAR; INTRAVENOUS ONCE
Status: COMPLETED | OUTPATIENT
Start: 2023-05-23 | End: 2023-05-23

## 2023-05-23 RX ADMIN — DEXAMETHASONE SODIUM PHOSPHATE 5 MG: 10 INJECTION INTRAMUSCULAR; INTRAVENOUS at 07:32

## 2023-05-23 ASSESSMENT — ENCOUNTER SYMPTOMS
SORE THROAT: 1
COUGH: 1
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
## 2023-05-26 ENCOUNTER — OFFICE VISIT (OUTPATIENT)
Dept: PRIMARY CARE CLINIC | Age: 33
End: 2023-05-26
Payer: OTHER GOVERNMENT

## 2023-05-26 VITALS
BODY MASS INDEX: 27.58 KG/M2 | HEIGHT: 71 IN | SYSTOLIC BLOOD PRESSURE: 118 MMHG | OXYGEN SATURATION: 98 % | WEIGHT: 197 LBS | DIASTOLIC BLOOD PRESSURE: 78 MMHG | HEART RATE: 71 BPM | TEMPERATURE: 98 F

## 2023-05-26 DIAGNOSIS — Z30.09 VASECTOMY EVALUATION: ICD-10-CM

## 2023-05-26 DIAGNOSIS — Z13.220 ENCOUNTER FOR LIPID SCREENING FOR CARDIOVASCULAR DISEASE: ICD-10-CM

## 2023-05-26 DIAGNOSIS — R68.82 DECREASED LIBIDO: ICD-10-CM

## 2023-05-26 DIAGNOSIS — F90.0 ATTENTION DEFICIT HYPERACTIVITY DISORDER (ADHD), PREDOMINANTLY INATTENTIVE TYPE: ICD-10-CM

## 2023-05-26 DIAGNOSIS — Z13.6 ENCOUNTER FOR LIPID SCREENING FOR CARDIOVASCULAR DISEASE: ICD-10-CM

## 2023-05-26 DIAGNOSIS — R53.83 FATIGUE, UNSPECIFIED TYPE: Primary | ICD-10-CM

## 2023-05-26 PROCEDURE — 99214 OFFICE O/P EST MOD 30 MIN: CPT | Performed by: NURSE PRACTITIONER

## 2023-05-26 SDOH — ECONOMIC STABILITY: INCOME INSECURITY: HOW HARD IS IT FOR YOU TO PAY FOR THE VERY BASICS LIKE FOOD, HOUSING, MEDICAL CARE, AND HEATING?: PATIENT DECLINED

## 2023-05-26 SDOH — ECONOMIC STABILITY: FOOD INSECURITY: WITHIN THE PAST 12 MONTHS, YOU WORRIED THAT YOUR FOOD WOULD RUN OUT BEFORE YOU GOT MONEY TO BUY MORE.: PATIENT DECLINED

## 2023-05-26 SDOH — ECONOMIC STABILITY: HOUSING INSECURITY
IN THE LAST 12 MONTHS, WAS THERE A TIME WHEN YOU DID NOT HAVE A STEADY PLACE TO SLEEP OR SLEPT IN A SHELTER (INCLUDING NOW)?: PATIENT REFUSED

## 2023-05-26 SDOH — ECONOMIC STABILITY: FOOD INSECURITY: WITHIN THE PAST 12 MONTHS, THE FOOD YOU BOUGHT JUST DIDN'T LAST AND YOU DIDN'T HAVE MONEY TO GET MORE.: PATIENT DECLINED

## 2023-05-26 ASSESSMENT — PATIENT HEALTH QUESTIONNAIRE - PHQ9
10. IF YOU CHECKED OFF ANY PROBLEMS, HOW DIFFICULT HAVE THESE PROBLEMS MADE IT FOR YOU TO DO YOUR WORK, TAKE CARE OF THINGS AT HOME, OR GET ALONG WITH OTHER PEOPLE: 0
6. FEELING BAD ABOUT YOURSELF - OR THAT YOU ARE A FAILURE OR HAVE LET YOURSELF OR YOUR FAMILY DOWN: 0
9. THOUGHTS THAT YOU WOULD BE BETTER OFF DEAD, OR OF HURTING YOURSELF: 0
SUM OF ALL RESPONSES TO PHQ QUESTIONS 1-9: 0
2. FEELING DOWN, DEPRESSED OR HOPELESS: 0
SUM OF ALL RESPONSES TO PHQ QUESTIONS 1-9: 0
1. LITTLE INTEREST OR PLEASURE IN DOING THINGS: 0
SUM OF ALL RESPONSES TO PHQ9 QUESTIONS 1 & 2: 0
SUM OF ALL RESPONSES TO PHQ QUESTIONS 1-9: 0
5. POOR APPETITE OR OVEREATING: 0
3. TROUBLE FALLING OR STAYING ASLEEP: 0
SUM OF ALL RESPONSES TO PHQ QUESTIONS 1-9: 0
4. FEELING TIRED OR HAVING LITTLE ENERGY: 0
8. MOVING OR SPEAKING SO SLOWLY THAT OTHER PEOPLE COULD HAVE NOTICED. OR THE OPPOSITE, BEING SO FIGETY OR RESTLESS THAT YOU HAVE BEEN MOVING AROUND A LOT MORE THAN USUAL: 0
7. TROUBLE CONCENTRATING ON THINGS, SUCH AS READING THE NEWSPAPER OR WATCHING TELEVISION: 0

## 2023-05-26 ASSESSMENT — ENCOUNTER SYMPTOMS
NAUSEA: 0
DIARRHEA: 0
ABDOMINAL PAIN: 0
SORE THROAT: 0
SHORTNESS OF BREATH: 0
CHEST TIGHTNESS: 0
COUGH: 0
WHEEZING: 0

## 2023-05-26 NOTE — PROGRESS NOTES
for PRN. Akasharabella Sabillon was seen today for follow-up. Diagnoses and all orders for this visit:    Fatigue, unspecified type  -     CBC with Auto Differential; Future  -     TSH; Future  -     T3, Free; Future  -     Vitamin D 25 Hydroxy; Future  -     Vitamin B12; Future  -     Testosterone; Future    Decreased libido  -     Comprehensive Metabolic Panel; Future  -     Testosterone; Future    Vasectomy evaluation  -     External Referral To Urology    Attention deficit hyperactivity disorder (ADHD), predominantly inattentive type    Encounter for lipid screening for cardiovascular disease  -     Lipid Panel; Future      Medications Discontinued During This Encounter   Medication Reason    lisdexamfetamine (VYVANSE) 30 MG capsule LIST CLEANUP     There are no Patient Instructions on file for this visit. Patient voicesunderstanding and agrees to plans along with risks and benefits of plan. Counseling:  Loni Schlichter Carrell's case, medications and options were discussed in detail. Patient was instructed to call the office if he questionsregarding him treatment. Should him conditions worsen, he should return to office to be reassessed by NELLY Dalton. he Should to go the closest Emergency Department for any emergency. They verbalizedunderstanding the above instructions. Return for PRN.

## 2023-06-02 DIAGNOSIS — Z13.6 ENCOUNTER FOR LIPID SCREENING FOR CARDIOVASCULAR DISEASE: ICD-10-CM

## 2023-06-02 DIAGNOSIS — R68.82 DECREASED LIBIDO: ICD-10-CM

## 2023-06-02 DIAGNOSIS — R53.83 FATIGUE, UNSPECIFIED TYPE: ICD-10-CM

## 2023-06-02 DIAGNOSIS — Z13.220 ENCOUNTER FOR LIPID SCREENING FOR CARDIOVASCULAR DISEASE: ICD-10-CM

## 2023-06-02 LAB
25(OH)D3 SERPL-MCNC: 26.7 NG/ML
ALBUMIN SERPL-MCNC: 4.5 G/DL (ref 3.5–5.2)
ALP SERPL-CCNC: 79 U/L (ref 40–130)
ALT SERPL-CCNC: 27 U/L (ref 5–41)
ANION GAP SERPL CALCULATED.3IONS-SCNC: 13 MMOL/L (ref 7–19)
AST SERPL-CCNC: 22 U/L (ref 5–40)
BASOPHILS # BLD: 0.1 K/UL (ref 0–0.2)
BASOPHILS NFR BLD: 0.6 % (ref 0–1)
BILIRUB SERPL-MCNC: 0.4 MG/DL (ref 0.2–1.2)
BUN SERPL-MCNC: 9 MG/DL (ref 6–20)
CALCIUM SERPL-MCNC: 9.1 MG/DL (ref 8.6–10)
CHLORIDE SERPL-SCNC: 99 MMOL/L (ref 98–111)
CHOLEST SERPL-MCNC: 237 MG/DL (ref 160–199)
CO2 SERPL-SCNC: 25 MMOL/L (ref 22–29)
CREAT SERPL-MCNC: 1 MG/DL (ref 0.5–1.2)
EOSINOPHIL # BLD: 0.1 K/UL (ref 0–0.6)
EOSINOPHIL NFR BLD: 1.3 % (ref 0–5)
ERYTHROCYTE [DISTWIDTH] IN BLOOD BY AUTOMATED COUNT: 12.6 % (ref 11.5–14.5)
GLUCOSE SERPL-MCNC: 84 MG/DL (ref 74–109)
HCT VFR BLD AUTO: 44 % (ref 42–52)
HDLC SERPL-MCNC: 45 MG/DL (ref 55–121)
HGB BLD-MCNC: 14.9 G/DL (ref 14–18)
IMM GRANULOCYTES # BLD: 0 K/UL
LDLC SERPL CALC-MCNC: 158 MG/DL
LYMPHOCYTES # BLD: 2.5 K/UL (ref 1.1–4.5)
LYMPHOCYTES NFR BLD: 22.5 % (ref 20–40)
MCH RBC QN AUTO: 32.9 PG (ref 27–31)
MCHC RBC AUTO-ENTMCNC: 33.9 G/DL (ref 33–37)
MCV RBC AUTO: 97.1 FL (ref 80–94)
MONOCYTES # BLD: 0.8 K/UL (ref 0–0.9)
MONOCYTES NFR BLD: 7.2 % (ref 0–10)
NEUTROPHILS # BLD: 7.4 K/UL (ref 1.5–7.5)
NEUTS SEG NFR BLD: 68.1 % (ref 50–65)
PLATELET # BLD AUTO: 237 K/UL (ref 130–400)
PMV BLD AUTO: 10.8 FL (ref 9.4–12.4)
POTASSIUM SERPL-SCNC: 4.1 MMOL/L (ref 3.5–5)
PROT SERPL-MCNC: 7 G/DL (ref 6.6–8.7)
RBC # BLD AUTO: 4.53 M/UL (ref 4.7–6.1)
SODIUM SERPL-SCNC: 137 MMOL/L (ref 136–145)
T3FREE SERPL-MCNC: 3.3 PG/ML (ref 2–4.4)
TESTOST SERPL-MCNC: 456.7 NG/DL (ref 249–836)
TRIGL SERPL-MCNC: 172 MG/DL (ref 0–149)
TSH SERPL DL<=0.005 MIU/L-ACNC: 1.41 UIU/ML (ref 0.27–4.2)
VIT B12 SERPL-MCNC: 655 PG/ML (ref 211–946)
WBC # BLD AUTO: 10.9 K/UL (ref 4.8–10.8)

## 2023-06-05 DIAGNOSIS — D72.829 LEUKOCYTOSIS, UNSPECIFIED TYPE: Primary | ICD-10-CM

## 2023-06-05 DIAGNOSIS — E78.2 MIXED HYPERLIPIDEMIA: ICD-10-CM

## 2024-05-13 ENCOUNTER — OFFICE VISIT (OUTPATIENT)
Dept: PRIMARY CARE CLINIC | Age: 34
End: 2024-05-13
Payer: OTHER GOVERNMENT

## 2024-05-13 VITALS
HEIGHT: 70 IN | WEIGHT: 198.6 LBS | TEMPERATURE: 97.1 F | HEART RATE: 60 BPM | DIASTOLIC BLOOD PRESSURE: 86 MMHG | BODY MASS INDEX: 28.43 KG/M2 | SYSTOLIC BLOOD PRESSURE: 130 MMHG | OXYGEN SATURATION: 97 %

## 2024-05-13 DIAGNOSIS — E55.9 VITAMIN D DEFICIENCY: ICD-10-CM

## 2024-05-13 DIAGNOSIS — M54.9 MID BACK PAIN: ICD-10-CM

## 2024-05-13 DIAGNOSIS — Z00.00 WELL ADULT EXAM: ICD-10-CM

## 2024-05-13 DIAGNOSIS — F90.0 ATTENTION DEFICIT HYPERACTIVITY DISORDER (ADHD), PREDOMINANTLY INATTENTIVE TYPE: ICD-10-CM

## 2024-05-13 DIAGNOSIS — M54.50 CHRONIC MIDLINE LOW BACK PAIN WITHOUT SCIATICA: ICD-10-CM

## 2024-05-13 DIAGNOSIS — E78.2 MIXED HYPERLIPIDEMIA: ICD-10-CM

## 2024-05-13 DIAGNOSIS — M25.512 CHRONIC LEFT SHOULDER PAIN: ICD-10-CM

## 2024-05-13 DIAGNOSIS — G89.29 CHRONIC MIDLINE LOW BACK PAIN WITHOUT SCIATICA: ICD-10-CM

## 2024-05-13 DIAGNOSIS — F90.0 ATTENTION DEFICIT HYPERACTIVITY DISORDER (ADHD), PREDOMINANTLY INATTENTIVE TYPE: Primary | ICD-10-CM

## 2024-05-13 DIAGNOSIS — Z51.81 THERAPEUTIC DRUG MONITORING: ICD-10-CM

## 2024-05-13 DIAGNOSIS — G89.29 CHRONIC LEFT SHOULDER PAIN: ICD-10-CM

## 2024-05-13 LAB
ALCOHOL URINE: NEGATIVE
AMPHETAMINE SCREEN, URINE: NEGATIVE
BARBITURATE SCREEN, URINE: NEGATIVE
BENZODIAZEPINE SCREEN, URINE: NEGATIVE
BUPRENORPHINE URINE: NEGATIVE
COCAINE METABOLITE SCREEN URINE: NEGATIVE
FENTANYL SCREEN, URINE: NEGATIVE
GABAPENTIN SCREEN, URINE: NEGATIVE
MDMA URINE: NEGATIVE
METHADONE SCREEN, URINE: NEGATIVE
METHAMPHETAMINE, URINE: NEGATIVE
OPIATE SCREEN URINE: NEGATIVE
OXYCODONE SCREEN URINE: NEGATIVE
PHENCYCLIDINE SCREEN URINE: NEGATIVE
PROPOXYPHENE SCREEN, URINE: NEGATIVE
SYNTHETIC CANNABINOIDS(K2) SCREEN, URINE: NEGATIVE
THC SCREEN, URINE: NEGATIVE
TRAMADOL SCREEN URINE: NEGATIVE
TRICYCLIC ANTIDEPRESSANTS, UR: NEGATIVE

## 2024-05-13 PROCEDURE — 80305 DRUG TEST PRSMV DIR OPT OBS: CPT | Performed by: NURSE PRACTITIONER

## 2024-05-13 PROCEDURE — 99214 OFFICE O/P EST MOD 30 MIN: CPT | Performed by: NURSE PRACTITIONER

## 2024-05-13 RX ORDER — LISDEXAMFETAMINE DIMESYLATE 30 MG/1
30 CAPSULE ORAL EVERY MORNING
Qty: 30 CAPSULE | Refills: 0 | Status: SHIPPED | OUTPATIENT
Start: 2024-05-13 | End: 2024-05-13 | Stop reason: SDUPTHER

## 2024-05-13 ASSESSMENT — PATIENT HEALTH QUESTIONNAIRE - PHQ9
4. FEELING TIRED OR HAVING LITTLE ENERGY: NOT AT ALL
SUM OF ALL RESPONSES TO PHQ QUESTIONS 1-9: 1
SUM OF ALL RESPONSES TO PHQ QUESTIONS 1-9: 1
1. LITTLE INTEREST OR PLEASURE IN DOING THINGS: NOT AT ALL
5. POOR APPETITE OR OVEREATING: NOT AT ALL
8. MOVING OR SPEAKING SO SLOWLY THAT OTHER PEOPLE COULD HAVE NOTICED. OR THE OPPOSITE, BEING SO FIGETY OR RESTLESS THAT YOU HAVE BEEN MOVING AROUND A LOT MORE THAN USUAL: NOT AT ALL
SUM OF ALL RESPONSES TO PHQ QUESTIONS 1-9: 1
9. THOUGHTS THAT YOU WOULD BE BETTER OFF DEAD, OR OF HURTING YOURSELF: NOT AT ALL
2. FEELING DOWN, DEPRESSED OR HOPELESS: NOT AT ALL
1. LITTLE INTEREST OR PLEASURE IN DOING THINGS: NOT AT ALL
3. TROUBLE FALLING OR STAYING ASLEEP: NOT AT ALL
4. FEELING TIRED OR HAVING LITTLE ENERGY: NOT AT ALL
SUM OF ALL RESPONSES TO PHQ QUESTIONS 1-9: 1
2. FEELING DOWN, DEPRESSED OR HOPELESS: NOT AT ALL
7. TROUBLE CONCENTRATING ON THINGS, SUCH AS READING THE NEWSPAPER OR WATCHING TELEVISION: SEVERAL DAYS
5. POOR APPETITE OR OVEREATING: NOT AT ALL
8. MOVING OR SPEAKING SO SLOWLY THAT OTHER PEOPLE COULD HAVE NOTICED. OR THE OPPOSITE - BEING SO FIDGETY OR RESTLESS THAT YOU HAVE BEEN MOVING AROUND A LOT MORE THAN USUAL: NOT AT ALL
SUM OF ALL RESPONSES TO PHQ QUESTIONS 1-9: 1
6. FEELING BAD ABOUT YOURSELF - OR THAT YOU ARE A FAILURE OR HAVE LET YOURSELF OR YOUR FAMILY DOWN: NOT AT ALL
10. IF YOU CHECKED OFF ANY PROBLEMS, HOW DIFFICULT HAVE THESE PROBLEMS MADE IT FOR YOU TO DO YOUR WORK, TAKE CARE OF THINGS AT HOME, OR GET ALONG WITH OTHER PEOPLE: NOT DIFFICULT AT ALL
9. THOUGHTS THAT YOU WOULD BE BETTER OFF DEAD, OR OF HURTING YOURSELF: NOT AT ALL
7. TROUBLE CONCENTRATING ON THINGS, SUCH AS READING THE NEWSPAPER OR WATCHING TELEVISION: SEVERAL DAYS
10. IF YOU CHECKED OFF ANY PROBLEMS, HOW DIFFICULT HAVE THESE PROBLEMS MADE IT FOR YOU TO DO YOUR WORK, TAKE CARE OF THINGS AT HOME, OR GET ALONG WITH OTHER PEOPLE: NOT DIFFICULT AT ALL
3. TROUBLE FALLING OR STAYING ASLEEP: NOT AT ALL
6. FEELING BAD ABOUT YOURSELF - OR THAT YOU ARE A FAILURE OR HAVE LET YOURSELF OR YOUR FAMILY DOWN: NOT AT ALL
SUM OF ALL RESPONSES TO PHQ9 QUESTIONS 1 & 2: 0

## 2024-05-13 ASSESSMENT — ENCOUNTER SYMPTOMS
NAUSEA: 0
ABDOMINAL PAIN: 0
SHORTNESS OF BREATH: 0
DIARRHEA: 0
CHEST TIGHTNESS: 0
WHEEZING: 0

## 2024-05-13 NOTE — PROGRESS NOTES
Mr.Sean T Carrell is a 34 y.o. male who presents today for  Chief Complaint   Patient presents with    Follow-up     prescription refill       HPI:  Here for follow-up on ADHD.  [place] past [place] and recently returned home.    He was previously taking Vyvanse 30 mg for ADHD, inattentive type.  He tolerated well but felt it wore off in early afternoon around 3 PM.  He was intolerant of Adderall due to side effects, facial tic and abnormal movements.    He has had mid and lower back pain for the past several months.  Pain does not radiate to legs.  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  He has increased pain with abduction and reaching overhead and back.  He states he had an MRI while deployed showing inflamed labrum but no tear.  He is taking OTC analgesics as needed.  Not daily.    Labs last year showed hyperlipidemia.  Currently diet controlled.  Also mildly low vitamin D.  He is taking an MVI.        Review of Systems   Constitutional:  Negative for chills and fever.   HENT:  Negative for congestion, ear pain and sore throat.    Respiratory:  Negative for cough, chest tightness, shortness of breath and wheezing.    Cardiovascular:  Negative for chest pain.   Gastrointestinal:  Negative for abdominal pain, diarrhea and nausea.   Musculoskeletal:  Positive for arthralgias (L shoulder) and back pain. Negative for myalgias.   Skin:  Negative for rash.   Neurological:  Negative for dizziness and light-headedness.   Psychiatric/Behavioral:  Positive for decreased concentration. Negative for dysphoric mood. The patient is not nervous/anxious.        Past Medical History:   Diagnosis Date    ADHD (attention deficit hyperactivity disorder)     Inguinal hernia     bilateral    Mild depression 2/7/2020    MRSA carrier     hx of previous positive mrsa; no active infection

## 2024-05-14 ENCOUNTER — TELEPHONE (OUTPATIENT)
Dept: PRIMARY CARE CLINIC | Age: 34
End: 2024-05-14

## 2024-05-14 RX ORDER — LISDEXAMFETAMINE DIMESYLATE 30 MG/1
30 CAPSULE ORAL EVERY MORNING
Qty: 30 CAPSULE | Refills: 0 | Status: SHIPPED | OUTPATIENT
Start: 2024-05-14 | End: 2024-06-13

## 2024-05-14 NOTE — TELEPHONE ENCOUNTER
The patient was seen on 05-. He has a referral to Chiropractic. I called the patient to ask where he wants to go. He has not had time to check. He states that he would like to know who we recommend?     He has  insurance.It has to be Prior Authorized first.

## 2024-05-14 NOTE — TELEPHONE ENCOUNTER
I scanned in the Referral authorization for for  to Chiropractic [facility] on 05-. Sent the referral to [facility] at [phone] on 05-. Waiting on the approval or denial.

## 2024-05-14 NOTE — TELEPHONE ENCOUNTER
I told him at the appointment that I did not really have any recommendations on anyone in particular.  [facility]  He had requested the referral order.

## 2024-05-24 NOTE — TELEPHONE ENCOUNTER
The referral was sent to [facility] today at [phone] on 05-.

## 2024-05-30 NOTE — TELEPHONE ENCOUNTER
I called [facility] [employer] on 05- and spoke with Angelina in [place] at [phone]. She had asked many questions that I had no answer's to. She stated that she's to contact the patient about the referral to Physical Therapy.     The patient 's insurance does not cover for Chiropractic services. She will let him know this also.     I sent the referral and supporting documentation to a email for the  "[employer]as  program on 05-.

## 2024-06-10 ENCOUNTER — OFFICE VISIT (OUTPATIENT)
Age: 34
End: 2024-06-10
Payer: OTHER GOVERNMENT

## 2024-06-10 VITALS
OXYGEN SATURATION: 98 % | TEMPERATURE: 97.9 F | DIASTOLIC BLOOD PRESSURE: 72 MMHG | SYSTOLIC BLOOD PRESSURE: 114 MMHG | HEART RATE: 63 BPM | RESPIRATION RATE: 20 BRPM | BODY MASS INDEX: 27.12 KG/M2 | WEIGHT: 189 LBS

## 2024-06-10 DIAGNOSIS — J02.9 SORE THROAT: ICD-10-CM

## 2024-06-10 DIAGNOSIS — R05.1 ACUTE COUGH: ICD-10-CM

## 2024-06-10 DIAGNOSIS — J02.0 ACUTE STREPTOCOCCAL PHARYNGITIS: Primary | ICD-10-CM

## 2024-06-10 LAB — S PYO AG THROAT QL: POSITIVE

## 2024-06-10 PROCEDURE — 96372 THER/PROPH/DIAG INJ SC/IM: CPT

## 2024-06-10 PROCEDURE — 87880 STREP A ASSAY W/OPTIC: CPT

## 2024-06-10 PROCEDURE — 99213 OFFICE O/P EST LOW 20 MIN: CPT

## 2024-06-10 RX ORDER — AMOXICILLIN AND CLAVULANATE POTASSIUM 875; 125 MG/1; MG/1
1 TABLET, FILM COATED ORAL 2 TIMES DAILY
Qty: 14 TABLET | Refills: 0 | Status: SHIPPED | OUTPATIENT
Start: 2024-06-10 | End: 2024-06-17

## 2024-06-10 RX ORDER — BROMPHENIRAMINE MALEATE, PSEUDOEPHEDRINE HYDROCHLORIDE, AND DEXTROMETHORPHAN HYDROBROMIDE 2; 30; 10 MG/5ML; MG/5ML; MG/5ML
5-10 SYRUP ORAL 4 TIMES DAILY PRN
Qty: 200 ML | Refills: 0 | Status: SHIPPED | OUTPATIENT
Start: 2024-06-10 | End: 2024-06-15

## 2024-06-10 RX ORDER — DEXAMETHASONE SODIUM PHOSPHATE 10 MG/ML
10 INJECTION INTRAMUSCULAR; INTRAVENOUS ONCE
Status: COMPLETED | OUTPATIENT
Start: 2024-06-10 | End: 2024-06-10

## 2024-06-10 RX ADMIN — DEXAMETHASONE SODIUM PHOSPHATE 10 MG: 10 INJECTION INTRAMUSCULAR; INTRAVENOUS at 08:35

## 2024-06-10 ASSESSMENT — ENCOUNTER SYMPTOMS
APNEA: 0
NAUSEA: 0
EYE REDNESS: 0
SORE THROAT: 1
COUGH: 1
COLOR CHANGE: 0
CHOKING: 0
DIARRHEA: 0
EYE PAIN: 0
ABDOMINAL PAIN: 0
STRIDOR: 0
CONSTIPATION: 0
ABDOMINAL DISTENTION: 0
WHEEZING: 0
EYE DISCHARGE: 0
FACIAL SWELLING: 0
SINUS PRESSURE: 0
CHEST TIGHTNESS: 0
SHORTNESS OF BREATH: 0
VOMITING: 0
SINUS PAIN: 0
TROUBLE SWALLOWING: 0

## 2024-06-10 NOTE — PROGRESS NOTES
Pulses: Normal pulses.      Heart sounds: Normal heart sounds. No murmur heard.     No friction rub. No gallop.   Pulmonary:      Effort: Pulmonary effort is normal. No respiratory distress.      Breath sounds: Normal breath sounds. No stridor. No wheezing, rhonchi or rales.   Abdominal:      General: Bowel sounds are normal. There is no distension.      Palpations: Abdomen is soft.      Tenderness: There is no abdominal tenderness. There is no guarding.   Musculoskeletal:         General: Normal range of motion.      Cervical back: Normal range of motion.   Skin:     General: Skin is warm.      Capillary Refill: Capillary refill takes less than 2 seconds.      Coloration: Skin is not jaundiced or pale.      Findings: No erythema or rash.   Neurological:      General: No focal deficit present.      Mental Status: He is alert and oriented to person, place, and time.      Deep Tendon Reflexes: Reflexes are normal and symmetric.   Psychiatric:         Attention and Perception: Attention normal.         Mood and Affect: Mood normal.         Behavior: Behavior normal. Behavior is cooperative.         Thought Content: Thought content normal.         /72   Pulse 63   Temp 97.9 °F (36.6 °C) (Temporal)   Resp 20   Wt 85.7 kg (189 lb)   SpO2 98%   BMI 27.12 kg/m²     Assessment   Assessment & Plan      Diagnosis Orders   1. Acute streptococcal pharyngitis  amoxicillin-clavulanate (AUGMENTIN) 875-125 MG per tablet    dexAMETHasone (DECADRON) injection 10 mg      2. Sore throat  POCT rapid strep A    dexAMETHasone (DECADRON) injection 10 mg      3. Acute cough  dexAMETHasone (DECADRON) injection 10 mg    brompheniramine-pseudoephedrine-DM 2-30-10 MG/5ML syrup          Plan   Strep test was positive today    Dexamethasone 10 mg injection given in office today    Augmentin prescribed. Take full course of antibiotics    Bromfed as needed for cough    Monitor for fever and treat as needed with tylenol or

## 2024-06-10 NOTE — PATIENT INSTRUCTIONS
Strep test was positive today    Dexamethasone 10 mg injection given in office today    Augmentin prescribed. Take full course of antibiotics    Bromfed as needed for cough    Monitor for fever and treat as needed with tylenol or ibuprofen    Recommended throat lozenges as needed and salt water gargles three times daily    Replace toothbrush in 24-48 hours after antibiotics are started    The patient is to follow up with PCP or return to clinic if symptoms worsen/fail to improve.    Any condition can change, despite proper treatment. Therefore, if symptoms still persist or worsen after treatment plan intitated today, either go to the nearest ER, or call PCP, or return to UC for further evaluation.    Urgent Care evaluation today is not a substitute for PCP visit. Follow up care is your responsibility to discuss and review this UC visit.

## 2024-07-24 DIAGNOSIS — F90.0 ATTENTION DEFICIT HYPERACTIVITY DISORDER (ADHD), PREDOMINANTLY INATTENTIVE TYPE: ICD-10-CM

## 2024-07-24 DIAGNOSIS — N52.9 ERECTILE DYSFUNCTION, UNSPECIFIED ERECTILE DYSFUNCTION TYPE: ICD-10-CM

## 2024-07-24 NOTE — TELEPHONE ENCOUNTER
Sean T Carrell called to request a refill on his medication.      Last office visit : 5/13/2024   Next office visit : 7/24/2024     Last UDS:   Benzodiazepine Screen, Urine   Date Value Ref Range Status   05/13/2024 Negative  Final     Buprenorphine Urine   Date Value Ref Range Status   05/13/2024 Negative  Final     Cocaine Metabolite Screen, Urine   Date Value Ref Range Status   05/13/2024 Negative  Final     Gabapentin Screen, Urine   Date Value Ref Range Status   05/13/2024 Negative  Final     MDMA, Urine   Date Value Ref Range Status   05/13/2024 Negative  Final     Oxycodone Screen, Ur   Date Value Ref Range Status   05/13/2024 Negative  Final     Propoxyphene Screen, Urine   Date Value Ref Range Status   05/13/2024 Negative  Final     THC Screen, Urine   Date Value Ref Range Status   05/13/2024 Negative  Final     Tricyclic Antidepressants, Urine   Date Value Ref Range Status   05/13/2024 Negative  Final       Last Josué: 5/13/24  Medication Contract: 5/13/24   Last Fill: 5/14/24    Requested Prescriptions     Pending Prescriptions Disp Refills    lisdexamfetamine (VYVANSE) 30 MG capsule 30 capsule 0     Sig: Take 1 capsule by mouth every morning for 30 days. Max Daily Amount: 30 mg         Please approve or refuse this medication.   Daniella Vizcarra MA

## 2024-07-25 RX ORDER — SILDENAFIL 50 MG/1
50 TABLET, FILM COATED ORAL DAILY PRN
Qty: 10 TABLET | Refills: 3 | Status: SHIPPED | OUTPATIENT
Start: 2024-07-25

## 2024-07-25 RX ORDER — LISDEXAMFETAMINE DIMESYLATE 30 MG/1
30 CAPSULE ORAL EVERY MORNING
Qty: 30 CAPSULE | Refills: 0 | Status: SHIPPED | OUTPATIENT
Start: 2024-07-25 | End: 2024-08-24

## 2024-07-25 NOTE — TELEPHONE ENCOUNTER
Sean T Carrell called to request a refill on his medication.      Last office visit : 5/13/2024   Next office visit : 8/13/2024     Requested Prescriptions     Pending Prescriptions Disp Refills    sildenafil (VIAGRA) 50 MG tablet 10 tablet 3     Sig: Take 1 tablet by mouth daily as needed for Erectile Dysfunction            Ester Cruz

## 2024-08-13 ENCOUNTER — TELEPHONE (OUTPATIENT)
Dept: PRIMARY CARE CLINIC | Age: 34
End: 2024-08-13

## 2024-11-25 ENCOUNTER — OFFICE VISIT (OUTPATIENT)
Dept: PRIMARY CARE CLINIC | Age: 34
End: 2024-11-25
Payer: OTHER GOVERNMENT

## 2024-11-25 ENCOUNTER — TELEPHONE (OUTPATIENT)
Dept: PRIMARY CARE CLINIC | Age: 34
End: 2024-11-25

## 2024-11-25 VITALS
WEIGHT: 197 LBS | HEIGHT: 70 IN | SYSTOLIC BLOOD PRESSURE: 122 MMHG | TEMPERATURE: 97.9 F | OXYGEN SATURATION: 98 % | DIASTOLIC BLOOD PRESSURE: 80 MMHG | BODY MASS INDEX: 28.2 KG/M2 | HEART RATE: 54 BPM

## 2024-11-25 DIAGNOSIS — E55.9 VITAMIN D DEFICIENCY: ICD-10-CM

## 2024-11-25 DIAGNOSIS — F90.0 ATTENTION DEFICIT HYPERACTIVITY DISORDER (ADHD), PREDOMINANTLY INATTENTIVE TYPE: Primary | ICD-10-CM

## 2024-11-25 DIAGNOSIS — R68.82 LOW LIBIDO: ICD-10-CM

## 2024-11-25 DIAGNOSIS — E78.2 MIXED HYPERLIPIDEMIA: ICD-10-CM

## 2024-11-25 DIAGNOSIS — N52.9 ERECTILE DYSFUNCTION, UNSPECIFIED ERECTILE DYSFUNCTION TYPE: ICD-10-CM

## 2024-11-25 PROCEDURE — 99214 OFFICE O/P EST MOD 30 MIN: CPT | Performed by: NURSE PRACTITIONER

## 2024-11-25 RX ORDER — LISDEXAMFETAMINE DIMESYLATE 20 MG/1
20 CAPSULE ORAL DAILY
Qty: 30 CAPSULE | Refills: 0 | Status: SHIPPED | OUTPATIENT
Start: 2024-11-25 | End: 2024-12-25

## 2024-11-25 RX ORDER — SILDENAFIL 100 MG/1
100 TABLET, FILM COATED ORAL DAILY PRN
Qty: 10 TABLET | Refills: 3 | Status: SHIPPED | OUTPATIENT
Start: 2024-11-25

## 2024-11-25 SDOH — ECONOMIC STABILITY: FOOD INSECURITY: WITHIN THE PAST 12 MONTHS, THE FOOD YOU BOUGHT JUST DIDN'T LAST AND YOU DIDN'T HAVE MONEY TO GET MORE.: NEVER TRUE

## 2024-11-25 SDOH — ECONOMIC STABILITY: INCOME INSECURITY: HOW HARD IS IT FOR YOU TO PAY FOR THE VERY BASICS LIKE FOOD, HOUSING, MEDICAL CARE, AND HEATING?: NOT HARD AT ALL

## 2024-11-25 SDOH — ECONOMIC STABILITY: FOOD INSECURITY: WITHIN THE PAST 12 MONTHS, YOU WORRIED THAT YOUR FOOD WOULD RUN OUT BEFORE YOU GOT MONEY TO BUY MORE.: NEVER TRUE

## 2024-11-25 ASSESSMENT — ENCOUNTER SYMPTOMS
DIARRHEA: 0
COUGH: 0
WHEEZING: 0
SHORTNESS OF BREATH: 0
NAUSEA: 0
ABDOMINAL PAIN: 0
CHEST TIGHTNESS: 0
SORE THROAT: 0

## 2024-11-25 NOTE — PROGRESS NOTES
Future  - CBC with Auto Differential; Future  - T4, Free; Future  - TSH; Future  - Comprehensive Metabolic Panel; Future    5. Vitamin D deficiency  -Check vitamin D level lab.         Return in about 3 months (around 2/25/2025) for physical, ADHD.    Kevin was seen today for follow-up and medication refill.    Diagnoses and all orders for this visit:    Attention deficit hyperactivity disorder (ADHD), predominantly inattentive type    Low libido  -     CBC with Auto Differential; Future  -     Testosterone; Future  -     Nia Moody APRN, Urology, [place]    Erectile dysfunction, unspecified erectile dysfunction type  -     sildenafil (VIAGRA) 100 MG tablet; Take 1 tablet by mouth daily as needed for Erectile Dysfunction  -     Nia Moody APRN, Urology, [place]    Mixed hyperlipidemia  -     Lipid Panel; Future  -     CBC with Auto Differential; Future  -     T4, Free; Future  -     TSH; Future  -     Comprehensive Metabolic Panel; Future    Vitamin D deficiency      Medications Discontinued During This Encounter   Medication Reason    sildenafil (VIAGRA) 50 MG tablet REORDER     There are no Patient Instructions on file for this visit.    Patient voicesunderstanding and agrees to plans along with risks and benefits of plan.    Counseling:  Sean T Carrell's case, medications and options were discussed in detail. Patient was instructed to call the office if he has any questions regarding him treatment. Should him conditions worsen, he should return to office to be reassessed by NELLY Sloan. he Should to go the closest Emergency Department for any emergency. They have verbalized understanding regarding  the above instructions.     Return in about 3 months (around 2/25/2025) for physical, ADHD.

## 2024-12-03 DIAGNOSIS — R79.89 LOW TESTOSTERONE: Primary | ICD-10-CM

## 2024-12-09 ENCOUNTER — TELEPHONE (OUTPATIENT)
Dept: UROLOGY | Age: 34
End: 2024-12-09

## 2024-12-09 NOTE — TELEPHONE ENCOUNTER
Front desk spoke with patient about labs needed prior to appointment on Wednesday. He stated he was still trying to get this worked out with his employer. He asked if he could call back if he needed to reschedule.

## 2025-02-14 DIAGNOSIS — R79.89 LOW TESTOSTERONE: ICD-10-CM

## 2025-02-14 DIAGNOSIS — E55.9 VITAMIN D DEFICIENCY: ICD-10-CM

## 2025-02-14 DIAGNOSIS — E78.2 MIXED HYPERLIPIDEMIA: ICD-10-CM

## 2025-02-14 DIAGNOSIS — R68.82 LOW LIBIDO: ICD-10-CM

## 2025-02-14 LAB
25(OH)D3 SERPL-MCNC: 23.8 NG/ML
ALBUMIN SERPL-MCNC: 4.4 G/DL (ref 3.5–5.2)
ALP SERPL-CCNC: 95 U/L (ref 40–129)
ALT SERPL-CCNC: 23 U/L (ref 5–41)
ANION GAP SERPL CALCULATED.3IONS-SCNC: 9 MMOL/L (ref 8–16)
AST SERPL-CCNC: 19 U/L (ref 5–40)
BASOPHILS # BLD: 0.1 K/UL (ref 0–0.2)
BASOPHILS NFR BLD: 1.2 % (ref 0–1)
BILIRUB SERPL-MCNC: 0.3 MG/DL (ref 0.2–1.2)
BUN SERPL-MCNC: 10 MG/DL (ref 6–20)
CALCIUM SERPL-MCNC: 9.2 MG/DL (ref 8.6–10)
CHLORIDE SERPL-SCNC: 103 MMOL/L (ref 98–107)
CHOLEST SERPL-MCNC: 204 MG/DL (ref 0–199)
CO2 SERPL-SCNC: 30 MMOL/L (ref 22–29)
CREAT SERPL-MCNC: 0.9 MG/DL (ref 0.7–1.2)
EOSINOPHIL # BLD: 0.3 K/UL (ref 0–0.6)
EOSINOPHIL NFR BLD: 5 % (ref 0–5)
ERYTHROCYTE [DISTWIDTH] IN BLOOD BY AUTOMATED COUNT: 12.2 % (ref 11.5–14.5)
GLUCOSE SERPL-MCNC: 100 MG/DL (ref 70–99)
HCT VFR BLD AUTO: 43.4 % (ref 42–52)
HDLC SERPL-MCNC: 37 MG/DL (ref 40–60)
HGB BLD-MCNC: 14.8 G/DL (ref 14–18)
IMM GRANULOCYTES # BLD: 0.1 K/UL
LDLC SERPL CALC-MCNC: 128 MG/DL
LYMPHOCYTES # BLD: 2.9 K/UL (ref 1.1–4.5)
LYMPHOCYTES NFR BLD: 48.3 % (ref 20–40)
MCH RBC QN AUTO: 32.6 PG (ref 27–31)
MCHC RBC AUTO-ENTMCNC: 34.1 G/DL (ref 33–37)
MCV RBC AUTO: 95.6 FL (ref 80–94)
MONOCYTES # BLD: 0.4 K/UL (ref 0–0.9)
MONOCYTES NFR BLD: 7.3 % (ref 0–10)
NEUTROPHILS # BLD: 2.2 K/UL (ref 1.5–7.5)
NEUTS SEG NFR BLD: 37.2 % (ref 50–65)
PLATELET # BLD AUTO: 291 K/UL (ref 130–400)
PMV BLD AUTO: 9.6 FL (ref 9.4–12.4)
POTASSIUM SERPL-SCNC: 4.3 MMOL/L (ref 3.5–5.1)
PROT SERPL-MCNC: 7.1 G/DL (ref 6.4–8.3)
PSA SERPL-MCNC: 0.39 NG/ML (ref 0–4)
RBC # BLD AUTO: 4.54 M/UL (ref 4.7–6.1)
SODIUM SERPL-SCNC: 142 MMOL/L (ref 136–145)
T4 FREE SERPL-MCNC: 0.99 NG/DL (ref 0.93–1.7)
TESTOST SERPL-MCNC: 573.8 NG/DL (ref 249–836)
TRIGL SERPL-MCNC: 197 MG/DL (ref 0–149)
TSH SERPL DL<=0.005 MIU/L-ACNC: 2.26 UIU/ML (ref 0.27–4.2)
WBC # BLD AUTO: 6 K/UL (ref 4.8–10.8)

## 2025-02-25 ENCOUNTER — TELEPHONE (OUTPATIENT)
Dept: PRIMARY CARE CLINIC | Age: 35
End: 2025-02-25

## 2025-03-10 ENCOUNTER — RESULTS FOLLOW-UP (OUTPATIENT)
Dept: PRIMARY CARE CLINIC | Age: 35
End: 2025-03-10

## 2025-03-16 NOTE — PROGRESS NOTES
Mr.Sean T Carrell is a 35 y.o. male who presents today for  Chief Complaint   Patient presents with    Annual Exam       HPI:  Here for physical    Hyperlipidemia  Diet controlled.  Attempting to reduce processed sugar and cholesterol from diet.  He has not been as strict with his diet and has not been exercising as much recently.  Has  FHx hyperlipidemia in mother.    He is taking Vyvanse 20 mg daily for ADHD inattentive predominant.  Dose was decreased in November due to concerns with ED and low libido.  He has noticed no change in these issues with the decreased dose.  ADHD symptoms are managed on current dose, however.  He is not taking it daily.    He continues to have fatigue and low energy.  Low libido, or ED.  He denies any urinary symptoms.  No significant depression but mood may be down at times.  No SI or HI.  He feels he is sleeping well.  He snores occasionally.  No history of apnea.    Labs reviewed  Lab Results   Component Value Date    WBC 6.0 02/14/2025    HGB 14.8 02/14/2025    HCT 43.4 02/14/2025    MCV 95.6 (H) 02/14/2025     02/14/2025     Lab Results   Component Value Date     02/14/2025    K 4.3 02/14/2025     02/14/2025    CO2 30 (H) 02/14/2025    BUN 10 02/14/2025    CREATININE 0.9 02/14/2025    GLUCOSE 100 (H) 02/14/2025    CALCIUM 9.2 02/14/2025    BILITOT 0.3 02/14/2025    ALKPHOS 95 02/14/2025    AST 19 02/14/2025    ALT 23 02/14/2025    LABGLOM >90 02/14/2025    GFRAA >59 03/22/2022       Lab Results   Component Value Date    CHOL 204 (H) 02/14/2025    TRIG 197 (H) 02/14/2025    HDL 37 (L) 02/14/2025     (H) 02/14/2025     Lab Results   Component Value Date    TSH 2.260 02/14/2025    FT3 3.3 06/02/2023    T4FREE 0.99 02/14/2025     Lab Results   Component Value Date    VITD25 23.8 (L) 02/14/2025     Lab Results   Component Value Date    TESTOSTERONE 573.8 02/14/2025    TESTOSTERONE 456.7 06/02/2023     Review of Systems   Constitutional:  Positive for fatigue.

## 2025-03-17 ENCOUNTER — OFFICE VISIT (OUTPATIENT)
Dept: PRIMARY CARE CLINIC | Age: 35
End: 2025-03-17
Payer: OTHER GOVERNMENT

## 2025-03-17 VITALS
HEART RATE: 91 BPM | SYSTOLIC BLOOD PRESSURE: 122 MMHG | OXYGEN SATURATION: 98 % | TEMPERATURE: 98.6 F | HEIGHT: 70 IN | BODY MASS INDEX: 28.92 KG/M2 | WEIGHT: 202 LBS | DIASTOLIC BLOOD PRESSURE: 78 MMHG

## 2025-03-17 DIAGNOSIS — R53.82 CHRONIC FATIGUE: ICD-10-CM

## 2025-03-17 DIAGNOSIS — F90.0 ATTENTION DEFICIT HYPERACTIVITY DISORDER (ADHD), PREDOMINANTLY INATTENTIVE TYPE: ICD-10-CM

## 2025-03-17 DIAGNOSIS — Z00.00 WELL ADULT EXAM: ICD-10-CM

## 2025-03-17 DIAGNOSIS — E55.9 VITAMIN D DEFICIENCY: ICD-10-CM

## 2025-03-17 DIAGNOSIS — E78.2 MIXED HYPERLIPIDEMIA: ICD-10-CM

## 2025-03-17 DIAGNOSIS — R68.82 LOW LIBIDO: ICD-10-CM

## 2025-03-17 DIAGNOSIS — N52.9 ERECTILE DYSFUNCTION, UNSPECIFIED ERECTILE DYSFUNCTION TYPE: ICD-10-CM

## 2025-03-17 DIAGNOSIS — Z51.81 MEDICATION MONITORING ENCOUNTER: ICD-10-CM

## 2025-03-17 LAB
ALCOHOL URINE: NORMAL
AMPHETAMINE SCREEN URINE: NORMAL
BARBITURATE SCREEN URINE: NORMAL
BENZODIAZEPINE SCREEN, URINE: NORMAL
BUPRENORPHINE URINE: NORMAL
COCAINE METABOLITE SCREEN URINE: NORMAL
FENTANYL SCREEN, URINE: NORMAL
GABAPENTIN SCREEN, URINE: NORMAL
MDMA, URINE: NORMAL
METHADONE SCREEN, URINE: NORMAL
METHAMPHETAMINE, URINE: NORMAL
OPIATE SCREEN URINE: NORMAL
OXYCODONE SCREEN URINE: NORMAL
PHENCYCLIDINE SCREEN URINE: NORMAL
PROPOXYPHENE SCREEN, URINE: NORMAL
SYNTHETIC CANNABINOIDS(K2) SCREEN, URINE: NORMAL
THC SCREEN, URINE: NORMAL
TRAMADOL SCREEN URINE: NORMAL
TRICYCLIC ANTIDEPRESSANTS, UR: NORMAL

## 2025-03-17 PROCEDURE — 99395 PREV VISIT EST AGE 18-39: CPT | Performed by: NURSE PRACTITIONER

## 2025-03-17 PROCEDURE — 80305 DRUG TEST PRSMV DIR OPT OBS: CPT | Performed by: NURSE PRACTITIONER

## 2025-03-17 RX ORDER — LISDEXAMFETAMINE DIMESYLATE 20 MG/1
20 CAPSULE ORAL DAILY
Qty: 30 CAPSULE | Refills: 0 | Status: SHIPPED | OUTPATIENT
Start: 2025-03-17 | End: 2025-04-16

## 2025-03-17 RX ORDER — BUPROPION HYDROCHLORIDE 150 MG/1
150 TABLET ORAL EVERY MORNING
Qty: 30 TABLET | Refills: 3 | Status: SHIPPED | OUTPATIENT
Start: 2025-03-17

## 2025-03-17 RX ORDER — LISDEXAMFETAMINE DIMESYLATE 20 MG/1
20 CAPSULE ORAL DAILY
Qty: 30 CAPSULE | Refills: 0 | Status: CANCELLED | OUTPATIENT
Start: 2025-03-17 | End: 2025-04-16

## 2025-03-17 SDOH — ECONOMIC STABILITY: FOOD INSECURITY: WITHIN THE PAST 12 MONTHS, THE FOOD YOU BOUGHT JUST DIDN'T LAST AND YOU DIDN'T HAVE MONEY TO GET MORE.: NEVER TRUE

## 2025-03-17 SDOH — ECONOMIC STABILITY: FOOD INSECURITY: WITHIN THE PAST 12 MONTHS, YOU WORRIED THAT YOUR FOOD WOULD RUN OUT BEFORE YOU GOT MONEY TO BUY MORE.: NEVER TRUE

## 2025-03-17 ASSESSMENT — PATIENT HEALTH QUESTIONNAIRE - PHQ9
1. LITTLE INTEREST OR PLEASURE IN DOING THINGS: NOT AT ALL
SUM OF ALL RESPONSES TO PHQ QUESTIONS 1-9: 3
9. THOUGHTS THAT YOU WOULD BE BETTER OFF DEAD, OR OF HURTING YOURSELF: NOT AT ALL
7. TROUBLE CONCENTRATING ON THINGS, SUCH AS READING THE NEWSPAPER OR WATCHING TELEVISION: NOT AT ALL
8. MOVING OR SPEAKING SO SLOWLY THAT OTHER PEOPLE COULD HAVE NOTICED. OR THE OPPOSITE, BEING SO FIGETY OR RESTLESS THAT YOU HAVE BEEN MOVING AROUND A LOT MORE THAN USUAL: NOT AT ALL
SUM OF ALL RESPONSES TO PHQ QUESTIONS 1-9: 3
5. POOR APPETITE OR OVEREATING: NOT AT ALL
6. FEELING BAD ABOUT YOURSELF - OR THAT YOU ARE A FAILURE OR HAVE LET YOURSELF OR YOUR FAMILY DOWN: NOT AT ALL
4. FEELING TIRED OR HAVING LITTLE ENERGY: NEARLY EVERY DAY
3. TROUBLE FALLING OR STAYING ASLEEP: NOT AT ALL
10. IF YOU CHECKED OFF ANY PROBLEMS, HOW DIFFICULT HAVE THESE PROBLEMS MADE IT FOR YOU TO DO YOUR WORK, TAKE CARE OF THINGS AT HOME, OR GET ALONG WITH OTHER PEOPLE: SOMEWHAT DIFFICULT
2. FEELING DOWN, DEPRESSED OR HOPELESS: NOT AT ALL
SUM OF ALL RESPONSES TO PHQ QUESTIONS 1-9: 3
SUM OF ALL RESPONSES TO PHQ QUESTIONS 1-9: 3

## 2025-04-23 ENCOUNTER — OFFICE VISIT (OUTPATIENT)
Dept: PRIMARY CARE CLINIC | Age: 35
End: 2025-04-23
Payer: OTHER GOVERNMENT

## 2025-04-23 VITALS
TEMPERATURE: 98 F | HEART RATE: 91 BPM | DIASTOLIC BLOOD PRESSURE: 78 MMHG | HEIGHT: 70 IN | WEIGHT: 202 LBS | OXYGEN SATURATION: 97 % | BODY MASS INDEX: 28.92 KG/M2 | SYSTOLIC BLOOD PRESSURE: 122 MMHG

## 2025-04-23 DIAGNOSIS — R53.82 CHRONIC FATIGUE: ICD-10-CM

## 2025-04-23 DIAGNOSIS — E78.2 MIXED HYPERLIPIDEMIA: ICD-10-CM

## 2025-04-23 DIAGNOSIS — N52.9 ERECTILE DYSFUNCTION, UNSPECIFIED ERECTILE DYSFUNCTION TYPE: ICD-10-CM

## 2025-04-23 DIAGNOSIS — F90.0 ATTENTION DEFICIT HYPERACTIVITY DISORDER (ADHD), PREDOMINANTLY INATTENTIVE TYPE: ICD-10-CM

## 2025-04-23 DIAGNOSIS — E55.9 VITAMIN D DEFICIENCY: ICD-10-CM

## 2025-04-23 DIAGNOSIS — F32.A MILD DEPRESSION: ICD-10-CM

## 2025-04-23 PROCEDURE — 99214 OFFICE O/P EST MOD 30 MIN: CPT | Performed by: NURSE PRACTITIONER

## 2025-04-23 ASSESSMENT — ENCOUNTER SYMPTOMS
COUGH: 0
CHEST TIGHTNESS: 0
ABDOMINAL PAIN: 0
DIARRHEA: 0
SORE THROAT: 0
NAUSEA: 0
WHEEZING: 0
SHORTNESS OF BREATH: 0
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
## 2025-05-12 ENCOUNTER — HOSPITAL ENCOUNTER (EMERGENCY)
Age: 35
Discharge: HOME | End: 2025-05-12
Attending: EMERGENCY MEDICINE
Payer: OTHER GOVERNMENT

## 2025-05-12 ENCOUNTER — APPOINTMENT (OUTPATIENT)
Dept: GENERAL RADIOLOGY | Age: 35
End: 2025-05-12
Payer: OTHER GOVERNMENT

## 2025-05-12 VITALS
SYSTOLIC BLOOD PRESSURE: 124 MMHG | BODY MASS INDEX: 28.63 KG/M2 | WEIGHT: 200 LBS | OXYGEN SATURATION: 95 % | HEART RATE: 83 BPM | DIASTOLIC BLOOD PRESSURE: 84 MMHG | RESPIRATION RATE: 17 BRPM | TEMPERATURE: 97.5 F | HEIGHT: 70 IN

## 2025-05-12 DIAGNOSIS — R00.2 PALPITATIONS: Primary | ICD-10-CM

## 2025-05-12 LAB
ALBUMIN SERPL-MCNC: 4.6 G/DL (ref 3.5–5.2)
ALP SERPL-CCNC: 78 U/L (ref 40–129)
ALT SERPL-CCNC: 55 U/L (ref 10–50)
ANION GAP SERPL CALCULATED.3IONS-SCNC: 12 MMOL/L (ref 8–16)
AST SERPL-CCNC: 37 U/L (ref 10–50)
BASOPHILS # BLD: 0.1 K/UL (ref 0–0.2)
BASOPHILS NFR BLD: 0.9 % (ref 0–1)
BILIRUB SERPL-MCNC: 0.5 MG/DL (ref 0.2–1.2)
BNP BLD-MCNC: <36 PG/ML (ref 0–124)
BUN SERPL-MCNC: 10 MG/DL (ref 6–20)
CALCIUM SERPL-MCNC: 9 MG/DL (ref 8.6–10)
CHLORIDE SERPL-SCNC: 102 MMOL/L (ref 98–107)
CO2 SERPL-SCNC: 26 MMOL/L (ref 22–29)
CREAT SERPL-MCNC: 1.1 MG/DL (ref 0.7–1.2)
EOSINOPHIL # BLD: 0.1 K/UL (ref 0–0.6)
EOSINOPHIL NFR BLD: 1.4 % (ref 0–5)
ERYTHROCYTE [DISTWIDTH] IN BLOOD BY AUTOMATED COUNT: 12.4 % (ref 11.5–14.5)
GLUCOSE SERPL-MCNC: 78 MG/DL (ref 70–99)
HCT VFR BLD AUTO: 43.9 % (ref 42–52)
HGB BLD-MCNC: 15.5 G/DL (ref 14–18)
IMM GRANULOCYTES # BLD: 0 K/UL
INR PPP: 0.97 (ref 0.88–1.18)
LIPASE SERPL-CCNC: 31 U/L (ref 13–60)
LYMPHOCYTES # BLD: 2.9 K/UL (ref 1.1–4.5)
LYMPHOCYTES NFR BLD: 43.4 % (ref 20–40)
MCH RBC QN AUTO: 33 PG (ref 27–31)
MCHC RBC AUTO-ENTMCNC: 35.3 G/DL (ref 33–37)
MCV RBC AUTO: 93.6 FL (ref 80–94)
MONOCYTES # BLD: 0.5 K/UL (ref 0–0.9)
MONOCYTES NFR BLD: 6.8 % (ref 0–10)
NEUTROPHILS # BLD: 3.2 K/UL (ref 1.5–7.5)
NEUTS SEG NFR BLD: 47.2 % (ref 50–65)
PLATELET # BLD AUTO: 228 K/UL (ref 130–400)
PMV BLD AUTO: 10 FL (ref 9.4–12.4)
POTASSIUM SERPL-SCNC: 3.3 MMOL/L (ref 3.5–5.1)
PROT SERPL-MCNC: 7.7 G/DL (ref 6.4–8.3)
PROTHROMBIN TIME: 12.8 SEC (ref 12–14.6)
RBC # BLD AUTO: 4.69 M/UL (ref 4.7–6.1)
SODIUM SERPL-SCNC: 140 MMOL/L (ref 136–145)
TROPONIN, HIGH SENSITIVITY: <6 NG/L (ref 0–22)
WBC # BLD AUTO: 6.7 K/UL (ref 4.8–10.8)

## 2025-05-12 PROCEDURE — 83880 ASSAY OF NATRIURETIC PEPTIDE: CPT

## 2025-05-12 PROCEDURE — 36415 COLL VENOUS BLD VENIPUNCTURE: CPT

## 2025-05-12 PROCEDURE — 84484 ASSAY OF TROPONIN QUANT: CPT

## 2025-05-12 PROCEDURE — 85610 PROTHROMBIN TIME: CPT

## 2025-05-12 PROCEDURE — 85025 COMPLETE CBC W/AUTO DIFF WBC: CPT

## 2025-05-12 PROCEDURE — 71045 X-RAY EXAM CHEST 1 VIEW: CPT

## 2025-05-12 PROCEDURE — 80053 COMPREHEN METABOLIC PANEL: CPT

## 2025-05-12 PROCEDURE — 99285 EMERGENCY DEPT VISIT HI MDM: CPT

## 2025-05-12 PROCEDURE — 83690 ASSAY OF LIPASE: CPT

## 2025-05-12 ASSESSMENT — ENCOUNTER SYMPTOMS
SHORTNESS OF BREATH: 1
COUGH: 0
ABDOMINAL PAIN: 0
VOMITING: 0

## 2025-05-12 ASSESSMENT — HEART SCORE: ECG: NORMAL

## 2025-05-13 ENCOUNTER — HOSPITAL ENCOUNTER (OUTPATIENT)
Age: 35
Discharge: HOME | End: 2025-05-15
Payer: OTHER GOVERNMENT

## 2025-05-13 DIAGNOSIS — I49.9 IRREGULAR HEART RATE: ICD-10-CM

## 2025-05-13 PROCEDURE — 93242 EXT ECG>48HR<7D RECORDING: CPT

## 2025-05-13 NOTE — DISCHARGE INSTRUCTIONS
Please wear Zio as prescribed     Return to ED sooner for recheck or with medical for repeat occurrence or passing out

## 2025-05-13 NOTE — ED PROVIDER NOTES
[facility] EMERGENCY DEPARTMENT  eMERGENCY dEPARTMENT eNCOUnter      Pt Name: Sean T Carrell  MRN: [id number]  Birthdate 1990  Date of evaluation: 5/12/2025  Provider: Jer Hoffman MD    CHIEF COMPLAINT       Chief Complaint   Patient presents with    Chest Pain    Tachycardia     X30 minutes         HISTORY OF PRESENT ILLNESS   (Location/Symptom, Timing/Onset,Context/Setting, Quality, Duration, Modifying Factors, Severity)  Note limiting factors.   Sean T Carrell is a 35 y.o. male who presents to the emergency department with palpitations chest pain or shortness of breath.  The started just prior to arrival.  He was packing he is going on  training soon.  He states that he was at home packing he was not really doing much and he did develop palpitations including a heart rate to about 147.  Then dropped down to go to 50s and back up.  He states that he runs 2 to 3 miles a day he is in good shape he states he had an episode like this last week this lasted for about 45 minutes.  The patient denies any syncope.  The patient was concerned about this tonight as its happened twice now in a week's time and so he presents to the ER.  He feels much better on presentation to the ER.  On arrival he is initially mildly tachycardic in sinus rhythm at a rate of 105.  On my evaluation of the patient he is heart rate of 84 sinus rhythm blood pressure 120/91 SpO2 100%.  He feels better at this time.  He has not been sick ill had no fever rash etc.  He denies any history of cardiac issues or PE.  He has had no swelling in his legs and no blood clots.  He has no calf tenderness.    The history is provided by the patient and medical records.       NursingNotes were reviewed.    REVIEW OF SYSTEMS    (2-9 systems for level 4, 10 or more for level 5)     Review of Systems   Constitutional:  Negative for fever.   Respiratory:  Positive for shortness of breath. Negative for cough.    Cardiovascular:  Positive for chest pain

## 2025-05-13 NOTE — PROGRESS NOTES
PT RECEIVED RHYTHMSTAR MONITOR [id number] DURING EKG DEPT AFTER HOURS; DEVICE REGISTRATION COMPLETED @9106

## 2025-05-17 LAB
EKG P AXIS: 34 DEGREES
EKG P-R INTERVAL: 170 MS
EKG Q-T INTERVAL: 324 MS
EKG QRS DURATION: 96 MS
EKG QTC CALCULATION (BAZETT): 401 MS
EKG T AXIS: 19 DEGREES

## 2025-05-27 PROBLEM — I49.9 IRREGULAR HEART RATE: Status: ACTIVE | Noted: 2025-05-27

## 2025-07-19 DIAGNOSIS — R53.82 CHRONIC FATIGUE: ICD-10-CM

## 2025-07-21 RX ORDER — BUPROPION HYDROCHLORIDE 150 MG/1
150 TABLET ORAL EVERY MORNING
Qty: 30 TABLET | Refills: 0 | Status: SHIPPED | OUTPATIENT
Start: 2025-07-21

## 2025-07-25 ENCOUNTER — TELEPHONE (OUTPATIENT)
Dept: PRIMARY CARE CLINIC | Age: 35
End: 2025-07-25

## 2025-07-25 ENCOUNTER — OFFICE VISIT (OUTPATIENT)
Dept: PRIMARY CARE CLINIC | Age: 35
End: 2025-07-25
Payer: OTHER GOVERNMENT

## 2025-07-25 VITALS
HEIGHT: 70 IN | WEIGHT: 208.8 LBS | TEMPERATURE: 97.9 F | OXYGEN SATURATION: 97 % | HEART RATE: 71 BPM | DIASTOLIC BLOOD PRESSURE: 82 MMHG | SYSTOLIC BLOOD PRESSURE: 120 MMHG | BODY MASS INDEX: 29.89 KG/M2

## 2025-07-25 DIAGNOSIS — E78.2 MIXED HYPERLIPIDEMIA: ICD-10-CM

## 2025-07-25 DIAGNOSIS — F90.0 ATTENTION DEFICIT HYPERACTIVITY DISORDER (ADHD), PREDOMINANTLY INATTENTIVE TYPE: ICD-10-CM

## 2025-07-25 DIAGNOSIS — E66.3 OVERWEIGHT: ICD-10-CM

## 2025-07-25 DIAGNOSIS — R73.9 HYPERGLYCEMIA: ICD-10-CM

## 2025-07-25 DIAGNOSIS — R00.2 PALPITATIONS: ICD-10-CM

## 2025-07-25 DIAGNOSIS — F32.A MILD DEPRESSION: ICD-10-CM

## 2025-07-25 PROCEDURE — 99214 OFFICE O/P EST MOD 30 MIN: CPT | Performed by: NURSE PRACTITIONER

## 2025-07-25 ASSESSMENT — ENCOUNTER SYMPTOMS
CHEST TIGHTNESS: 0
ABDOMINAL PAIN: 0
SORE THROAT: 0
WHEEZING: 0
DIARRHEA: 0
NAUSEA: 0
COUGH: 0
SHORTNESS OF BREATH: 0

## 2025-07-25 NOTE — TELEPHONE ENCOUNTER
Patient called back stating we asked him to check with insurance if he would need a pa for his medication, patient states he does need one     tirzepatide-weight management (ZEPBOUND) 2.5 MG/0.5ML SOAJ subCUTAneous auto-injector pen

## 2025-07-25 NOTE — PROGRESS NOTES
Mr.Sean T Carrell is a 35 y.o. male who presents today for  Chief Complaint   Patient presents with    Cholesterol Problem    ADHD     Only using Vyvanse when he really needs it.     Follow-up     He would like to discuss weight loss medication        HPI:  History of Present Illness  The patient presents for a follow-up regarding ADHD management, weight concerns, and intermittent palpitations.    He reports that his current dose of Vyvanse 20 mg is effective at current dose for his ADHD.  He is only taking this as needed, approximately once a week. He also takes Wellbutrin daily, which he finds beneficial for mild depression. However, he notes that the combination of Vyvanse and Wellbutrin can cause adverse effects making him feel worse, so he avoids taking them together. On days when he takes Vyvanse he holds the Wellbutrin briefly.  This regimen does not cause any adverse effects such as dizziness or lightheadedness.    He expresses concern about his weight, which he believes is hindering his performance in the . Despite maintaining a regular gym routine and a balanced diet, he struggles to lose weight.  He has made dietary changes including limiting portion sizes and avoiding processed foods.  He is going to the gym 3 to 4 days a week.  He has had gradual weight gain over the past year and feels sluggish with less endurance.   He has comorbidities including hyperlipidemia which is currently diet controlled.  He also has a history of mild hyperglycemia.    He was seen at [facility] ED 5/12/2025 with chest pain and tachycardia.  His cardiac workup was negative.  He was discharged on a ZIO monitor x 7 days which showed underlying sinus rhythm with occasional tachycardia.  No other acute findings.  He continues to experience symptoms but only intermittent and less severe.  He experiences episodes of palpitations approximately once a week.. These episodes typically last between 5 to 10 minutes.  He feels like he

## 2025-07-28 ENCOUNTER — TELEPHONE (OUTPATIENT)
Dept: PRIMARY CARE CLINIC | Age: 35
End: 2025-07-28

## 2025-07-28 NOTE — TELEPHONE ENCOUNTER
The patient called requesting  if the PA was done on the Zepbound. The Pa was submitted and Approved. I called the patient and pharmacy to let them know of the approval. I spoke with Kel today 07- to let him of the approval.

## 2025-08-20 ENCOUNTER — TELEPHONE (OUTPATIENT)
Dept: PRIMARY CARE CLINIC | Age: 35
End: 2025-08-20

## 2025-08-22 ENCOUNTER — TELEPHONE (OUTPATIENT)
Dept: PRIMARY CARE CLINIC | Age: 35
End: 2025-08-22

## 2025-08-24 DIAGNOSIS — F32.A MILD DEPRESSION: Primary | ICD-10-CM

## 2025-08-24 DIAGNOSIS — R53.82 CHRONIC FATIGUE: ICD-10-CM

## 2025-08-25 RX ORDER — BUPROPION HYDROCHLORIDE 150 MG/1
150 TABLET ORAL EVERY MORNING
Qty: 90 TABLET | Refills: 1 | Status: SHIPPED | OUTPATIENT
Start: 2025-08-25

## (undated) DEVICE — TUBE ET 8MM NSL ORAL BASIC CUF INTMED MURPHY EYE RADPQ MRK

## (undated) DEVICE — SOLUTION IV IRRIG WATER 1000ML POUR BRL 2F7114

## (undated) DEVICE — SUTURE MCRYL SZ 4-0 L18IN ABSRB UD L19MM PS-2 3/8 CIR PRIM Y496G

## (undated) DEVICE — COVER,MAYO STAND,STERILE: Brand: MEDLINE

## (undated) DEVICE — ADHESIVE SKIN CLSR 0.7ML TOP DERMBND ADV

## (undated) DEVICE — SUTURE 0 STRATAFIX SYMMETRIC PDS + 23CM CT-2 SXPP1A446

## (undated) DEVICE — TOTAL TRAY, 16FR 10ML SIL FOLEY, URN: Brand: MEDLINE

## (undated) DEVICE — 3M™ IOBAN™ 2 ANTIMICROBIAL INCISE DRAPE 6650EZ: Brand: IOBAN™ 2

## (undated) DEVICE — GENERAL LAP CDS

## (undated) DEVICE — BLADE LARYNSCP HNDL MAC 3 DISP CURAVIEW LED

## (undated) DEVICE — TIP COVER ACCESSORY

## (undated) DEVICE — TISSUE RETRIEVAL SYSTEM: Brand: INZII RETRIEVAL SYSTEM

## (undated) DEVICE — SUTURE VCRL SZ 0 L27IN ABSRB VLT L36MM UR-5 5/8 CIR J376H

## (undated) DEVICE — CANNULA SEAL

## (undated) DEVICE — SOLUTION IV IRRIG POUR BRL 0.9% SODIUM CHL 2F7124

## (undated) DEVICE — ARM DRAPE

## (undated) DEVICE — GOWN,PREVENTION PLUS,XL,ST,24/CS: Brand: MEDLINE

## (undated) DEVICE — SUTURE NONABSORBABLE BRAIDED 2-0 CT-2 1X30 IN ETHBND EXCEL X411H

## (undated) DEVICE — CATHETER IV 14 GAX2 IN WNG INTROCAN SAFETY

## (undated) DEVICE — GLOVE SURG SZ 7 CRM LTX FREE POLYISOPRENE POLYMER BEAD ANTI